# Patient Record
Sex: FEMALE | Race: WHITE | NOT HISPANIC OR LATINO | Employment: OTHER | URBAN - METROPOLITAN AREA
[De-identification: names, ages, dates, MRNs, and addresses within clinical notes are randomized per-mention and may not be internally consistent; named-entity substitution may affect disease eponyms.]

---

## 2017-04-27 ENCOUNTER — ALLSCRIPTS OFFICE VISIT (OUTPATIENT)
Dept: OTHER | Facility: OTHER | Age: 67
End: 2017-04-27

## 2017-04-27 DIAGNOSIS — Z12.31 ENCOUNTER FOR SCREENING MAMMOGRAM FOR MALIGNANT NEOPLASM OF BREAST: ICD-10-CM

## 2017-06-01 ENCOUNTER — HOSPITAL ENCOUNTER (OUTPATIENT)
Dept: RADIOLOGY | Age: 67
Discharge: HOME | End: 2017-06-01
Payer: COMMERCIAL

## 2017-06-01 DIAGNOSIS — Z12.31 ENCOUNTER FOR SCREENING MAMMOGRAM FOR MALIGNANT NEOPLASM OF BREAST: ICD-10-CM

## 2017-06-01 PROCEDURE — G0202 SCR MAMMO BI INCL CAD: HCPCS

## 2018-01-12 VITALS
BODY MASS INDEX: 29.45 KG/M2 | HEIGHT: 60 IN | WEIGHT: 150 LBS | SYSTOLIC BLOOD PRESSURE: 122 MMHG | DIASTOLIC BLOOD PRESSURE: 80 MMHG

## 2018-05-21 DIAGNOSIS — Z12.31 ENCOUNTER FOR SCREENING MAMMOGRAM FOR MALIGNANT NEOPLASM OF BREAST: Primary | ICD-10-CM

## 2018-06-05 ENCOUNTER — TRANSCRIBE ORDERS (OUTPATIENT)
Dept: RADIOLOGY | Facility: CLINIC | Age: 68
End: 2018-06-05

## 2018-06-06 ENCOUNTER — HOSPITAL ENCOUNTER (OUTPATIENT)
Dept: RADIOLOGY | Age: 68
Discharge: HOME | End: 2018-06-06
Payer: COMMERCIAL

## 2018-06-06 DIAGNOSIS — Z12.31 ENCOUNTER FOR SCREENING MAMMOGRAM FOR MALIGNANT NEOPLASM OF BREAST: ICD-10-CM

## 2018-06-06 PROCEDURE — 77067 SCR MAMMO BI INCL CAD: CPT

## 2019-04-23 ENCOUNTER — ANNUAL EXAM (OUTPATIENT)
Dept: OBGYN CLINIC | Facility: CLINIC | Age: 69
End: 2019-04-23
Payer: COMMERCIAL

## 2019-04-23 VITALS
SYSTOLIC BLOOD PRESSURE: 122 MMHG | WEIGHT: 153.2 LBS | HEIGHT: 60 IN | DIASTOLIC BLOOD PRESSURE: 80 MMHG | BODY MASS INDEX: 30.08 KG/M2

## 2019-04-23 DIAGNOSIS — Z01.419 ENCOUNTER FOR ANNUAL ROUTINE GYNECOLOGICAL EXAMINATION: Primary | ICD-10-CM

## 2019-04-23 DIAGNOSIS — Z12.39 BREAST CANCER SCREENING: ICD-10-CM

## 2019-04-23 DIAGNOSIS — M85.80 OSTEOPENIA, UNSPECIFIED LOCATION: ICD-10-CM

## 2019-04-23 PROCEDURE — G0101 CA SCREEN;PELVIC/BREAST EXAM: HCPCS | Performed by: OBSTETRICS & GYNECOLOGY

## 2019-04-23 RX ORDER — EZETIMIBE 10 MG/1
TABLET ORAL
COMMUNITY
Start: 2019-03-29

## 2019-04-23 RX ORDER — PANTOPRAZOLE SODIUM 40 MG/1
TABLET, DELAYED RELEASE ORAL
COMMUNITY

## 2019-04-23 RX ORDER — FENOFIBRATE 145 MG/1
145 TABLET, COATED ORAL
COMMUNITY
Start: 2018-05-30

## 2019-06-12 ENCOUNTER — TELEPHONE (OUTPATIENT)
Dept: OBGYN CLINIC | Facility: CLINIC | Age: 69
End: 2019-06-12

## 2019-06-12 ENCOUNTER — HOSPITAL ENCOUNTER (OUTPATIENT)
Dept: RADIOLOGY | Age: 69
Discharge: HOME | End: 2019-06-12
Payer: COMMERCIAL

## 2019-06-12 VITALS — BODY MASS INDEX: 29.45 KG/M2 | HEIGHT: 60 IN | WEIGHT: 150 LBS

## 2019-06-12 DIAGNOSIS — M85.80 OSTEOPENIA, UNSPECIFIED LOCATION: ICD-10-CM

## 2019-06-12 DIAGNOSIS — Z12.39 BREAST CANCER SCREENING: ICD-10-CM

## 2019-06-12 PROCEDURE — 77080 DXA BONE DENSITY AXIAL: CPT

## 2019-06-12 PROCEDURE — 77067 SCR MAMMO BI INCL CAD: CPT

## 2020-06-17 ENCOUNTER — HOSPITAL ENCOUNTER (OUTPATIENT)
Dept: RADIOLOGY | Age: 70
Discharge: HOME | End: 2020-06-17
Payer: COMMERCIAL

## 2020-06-17 VITALS — HEIGHT: 60 IN | WEIGHT: 150 LBS | BODY MASS INDEX: 29.45 KG/M2

## 2020-06-17 DIAGNOSIS — Z12.31 ENCOUNTER FOR SCREENING MAMMOGRAM FOR MALIGNANT NEOPLASM OF BREAST: ICD-10-CM

## 2020-06-17 PROCEDURE — 77063 BREAST TOMOSYNTHESIS BI: CPT

## 2020-06-17 PROCEDURE — 77067 SCR MAMMO BI INCL CAD: CPT

## 2021-03-30 DIAGNOSIS — Z23 ENCOUNTER FOR IMMUNIZATION: ICD-10-CM

## 2021-04-28 ENCOUNTER — ANNUAL EXAM (OUTPATIENT)
Dept: OBGYN CLINIC | Facility: CLINIC | Age: 71
End: 2021-04-28
Payer: COMMERCIAL

## 2021-04-28 VITALS
SYSTOLIC BLOOD PRESSURE: 112 MMHG | HEIGHT: 60 IN | DIASTOLIC BLOOD PRESSURE: 72 MMHG | BODY MASS INDEX: 30.04 KG/M2 | WEIGHT: 153 LBS

## 2021-04-28 DIAGNOSIS — Z01.419 ENCOUNTER FOR ANNUAL ROUTINE GYNECOLOGICAL EXAMINATION: Primary | ICD-10-CM

## 2021-04-28 DIAGNOSIS — Z13.820 ENCOUNTER FOR SCREENING FOR OSTEOPOROSIS: ICD-10-CM

## 2021-04-28 DIAGNOSIS — Z12.31 ENCOUNTER FOR SCREENING MAMMOGRAM FOR BREAST CANCER: ICD-10-CM

## 2021-04-28 PROCEDURE — G0101 CA SCREEN;PELVIC/BREAST EXAM: HCPCS | Performed by: OBSTETRICS & GYNECOLOGY

## 2021-04-28 RX ORDER — MULTIVIT,IRON,MINERALS/LUTEIN
TABLET ORAL
COMMUNITY

## 2021-04-28 NOTE — PROGRESS NOTES
Assessment/Plan:   Pap smear deferred due to low risk status  Encouraged self-breast examination as well as calcium supplementation  Continue annual mammogram   Reviewed colon cancer screening, due for colonoscopy this year  She will have follow-up DEXA scan follow-up osteopenia  She is reluctant to go on any medication, would like to avoid all bisphosphonate agents  She will continue to follow-up with her primary care physician as scheduled  Return to office in 2 years per Medicare recommendations/protocol  No problem-specific Assessment & Plan notes found for this encounter  Diagnoses and all orders for this visit:    Encounter for annual routine gynecological examination    Encounter for screening mammogram for breast cancer  -     Mammo screening bilateral w 3d & cad; Future    Encounter for screening for osteoporosis  -     DXA bone density spine hip and pelvis; Future    Other orders  -     Cholecalciferol (Vitamin D3) 125 MCG (5000 UT) TABS; Take 5,000 Units by mouth daily  -     Multiple Vitamins-Minerals (Centrum Silver Ultra Womens) TABS; Take by mouth          Subjective:      Patient ID: Constantin Benavides is a 69 y o  female  HPI       This is a pleasant 69-year-old female P1 ( x1) presents for her annual gyn exam   Patient went through menopause and  after JUAN BSO  She was on hormone replacement therapy for approximately 15 years  She denies any vaginal bleeding or spotting  There has been no changes in bowel or bladder function  She underwent colonoscopy   She does follow up with her family physician on a regular basis  She has been in a monogamous relationship with her  for over 50 years  Pap smears had been normal     She does follow up with Dermatology for diffuse rash consistent with granuloma angulari  she did receive her COVID vaccine      The following portions of the patient's history were reviewed and updated as appropriate: allergies, current medications, past family history, past medical history, past social history, past surgical history and problem list     Review of Systems   Constitutional: Negative for fatigue, fever and unexpected weight change  Respiratory: Negative for cough, chest tightness, shortness of breath and wheezing  Cardiovascular: Negative  Negative for chest pain and palpitations  Gastrointestinal: Negative  Negative for abdominal distention, abdominal pain, blood in stool, constipation, diarrhea, nausea and vomiting  Genitourinary: Negative  Negative for difficulty urinating, dyspareunia, dysuria, flank pain, frequency, genital sores, hematuria, pelvic pain, urgency, vaginal bleeding, vaginal discharge and vaginal pain  Skin: Negative for rash  Objective:      /72   Ht 4' 11 5" (1 511 m)   Wt 69 4 kg (153 lb)   BMI 30 39 kg/m²          Physical Exam  Constitutional:       Appearance: Normal appearance  She is well-developed  Cardiovascular:      Rate and Rhythm: Normal rate and regular rhythm  Pulmonary:      Effort: Pulmonary effort is normal       Breath sounds: Normal breath sounds  Chest:      Breasts:         Right: No inverted nipple, mass, nipple discharge, skin change or tenderness  Left: No inverted nipple, mass, nipple discharge, skin change or tenderness  Abdominal:      General: Bowel sounds are normal  There is no distension  Palpations: Abdomen is soft  Tenderness: There is no abdominal tenderness  There is no guarding or rebound  Genitourinary:     Labia:         Right: No rash, tenderness or lesion  Left: No rash, tenderness or lesion  Vagina: Normal  No signs of injury  No vaginal discharge, tenderness or lesions  Uterus: Absent  Adnexa:         Right: No mass, tenderness or fullness  Left: No mass, tenderness or fullness  Comments: External genitalia is within normal limits    The vagina is evident of estrogen deficiency  Cervix is surgically absent  The cuff is well-supported  Rectovaginal exam is confirmatory  Skin:     General: Skin is warm and dry  Neurological:      Mental Status: She is alert and oriented to person, place, and time

## 2021-06-21 ENCOUNTER — HOSPITAL ENCOUNTER (OUTPATIENT)
Dept: RADIOLOGY | Age: 71
Discharge: HOME | End: 2021-06-21
Payer: COMMERCIAL

## 2021-06-21 VITALS — BODY MASS INDEX: 30.04 KG/M2 | HEIGHT: 60 IN | WEIGHT: 153 LBS

## 2021-06-21 DIAGNOSIS — Z12.31 ENCOUNTER FOR SCREENING MAMMOGRAM FOR BREAST CANCER: ICD-10-CM

## 2021-06-21 PROCEDURE — 77063 BREAST TOMOSYNTHESIS BI: CPT

## 2021-06-21 PROCEDURE — 77067 SCR MAMMO BI INCL CAD: CPT

## 2021-06-28 ENCOUNTER — HOSPITAL ENCOUNTER (OUTPATIENT)
Dept: RADIOLOGY | Age: 71
Discharge: HOME | End: 2021-06-28
Payer: COMMERCIAL

## 2021-06-28 DIAGNOSIS — Z13.820 ENCOUNTER FOR SCREENING FOR OSTEOPOROSIS: ICD-10-CM

## 2021-06-28 PROCEDURE — 77080 DXA BONE DENSITY AXIAL: CPT

## 2021-06-29 ENCOUNTER — TELEPHONE (OUTPATIENT)
Dept: OBGYN CLINIC | Facility: CLINIC | Age: 71
End: 2021-06-29

## 2021-06-29 NOTE — TELEPHONE ENCOUNTER
----- Message from Christiano Post DO sent at 6/29/2021 12:52 PM EDT -----   Inform patient DEXA scan reveals osteopenia, significant improvement in prior DEXA in 2019  Recommend calcium and vitamin-D supplement with repeat DEXA scan in 2 years

## 2022-06-22 ENCOUNTER — HOSPITAL ENCOUNTER (OUTPATIENT)
Dept: RADIOLOGY | Age: 72
Discharge: HOME | End: 2022-06-22
Payer: COMMERCIAL

## 2022-06-22 VITALS — HEIGHT: 59 IN | BODY MASS INDEX: 30.24 KG/M2 | WEIGHT: 150 LBS

## 2022-06-22 DIAGNOSIS — Z12.31 VISIT FOR SCREENING MAMMOGRAM: ICD-10-CM

## 2022-06-22 PROCEDURE — 77063 BREAST TOMOSYNTHESIS BI: CPT

## 2022-06-22 PROCEDURE — 77067 SCR MAMMO BI INCL CAD: CPT

## 2023-04-03 ENCOUNTER — ANNUAL EXAM (OUTPATIENT)
Dept: OBGYN CLINIC | Facility: CLINIC | Age: 73
End: 2023-04-03

## 2023-04-03 VITALS
HEIGHT: 59 IN | SYSTOLIC BLOOD PRESSURE: 124 MMHG | DIASTOLIC BLOOD PRESSURE: 80 MMHG | BODY MASS INDEX: 28.02 KG/M2 | WEIGHT: 139 LBS

## 2023-04-03 DIAGNOSIS — Z12.39 ENCOUNTER FOR SCREENING FOR MALIGNANT NEOPLASM OF BREAST, UNSPECIFIED SCREENING MODALITY: ICD-10-CM

## 2023-04-03 DIAGNOSIS — M85.88 OSTEOPENIA OF LUMBAR SPINE: ICD-10-CM

## 2023-04-03 DIAGNOSIS — Z01.419 ENCOUNTER FOR ANNUAL ROUTINE GYNECOLOGICAL EXAMINATION: Primary | ICD-10-CM

## 2023-04-03 NOTE — PROGRESS NOTES
Assessment/Plan:  Pap smear deferred due to low risk status  Encourage self breast examination as well as calcium supplementation  Continue annual mammogram   Reviewed colon cancer screening, up-to-date with colonoscopy  Recommend DEXA scan follow-up osteopenia 2023  She will continue to follow-up with primary care as scheduled  Return to office in 2 years per Medicare recommendations/protocol  No problem-specific Assessment & Plan notes found for this encounter  Diagnoses and all orders for this visit:    Encounter for annual routine gynecological examination    Encounter for screening for malignant neoplasm of breast, unspecified screening modality    Osteopenia of lumbar spine  -     DXA bone density spine hip and pelvis; Future          Subjective:      Patient ID: Jeyson Abebe is a 67 y o  female  HPI     This is a pleasant 70-year-old female P1 ( x1, age 54) presents for GYN exam   Patient went through menopause in  after JUAN/BSO  She was on hormone replacement therapy for approximately 15 years  She denies any vaginal bleeding or spotting  No changes in bowel or bladder function  She has been  for 50 years  They have not been sexually active in several years   underwent prostate surgery approximately 5 years ago  Her Pap smears have been normal   She does follow-up with her family physician on a regular basis  She is scheduled for colonoscopy in the near future  TALOGAN    The following portions of the patient's history were reviewed and updated as appropriate: allergies, current medications, past family history, past medical history, past social history, past surgical history and problem list     Review of Systems   Constitutional: Negative for fatigue, fever and unexpected weight change  Respiratory: Negative for cough, chest tightness, shortness of breath and wheezing  Cardiovascular: Negative  Negative for chest pain and palpitations  "  Gastrointestinal: Negative  Negative for abdominal distention, abdominal pain, blood in stool, constipation, diarrhea, nausea and vomiting  Genitourinary: Negative  Negative for difficulty urinating, dyspareunia, dysuria, flank pain, frequency, genital sores, hematuria, pelvic pain, urgency, vaginal bleeding, vaginal discharge and vaginal pain  Skin: Negative for rash  Objective:      /80   Ht 4' 11\" (1 499 m)   Wt 63 kg (139 lb)   BMI 28 07 kg/m²          Physical Exam  Constitutional:       Appearance: Normal appearance  She is well-developed  Cardiovascular:      Rate and Rhythm: Normal rate and regular rhythm  Pulmonary:      Effort: Pulmonary effort is normal       Breath sounds: Normal breath sounds  Chest:   Breasts:     Right: No inverted nipple, mass, nipple discharge, skin change or tenderness  Left: No inverted nipple, mass, nipple discharge, skin change or tenderness  Abdominal:      General: Bowel sounds are normal  There is no distension  Palpations: Abdomen is soft  Tenderness: There is no abdominal tenderness  There is no guarding or rebound  Genitourinary:     Labia:         Right: No rash, tenderness or lesion  Left: No rash, tenderness or lesion  Vagina: Normal  No signs of injury  No vaginal discharge or tenderness  Uterus: Absent  Adnexa:         Right: No mass, tenderness or fullness  Left: No mass, tenderness or fullness  Neurological:      Mental Status: She is alert and oriented to person, place, and time  Psychiatric:         Behavior: Behavior normal        External genitalia is within normal limits  The vagina is evident of slight estrogen deficiency  The cervix is surgically absent  The cuff is well supported  Rectovaginal exam is confirmatory    "

## 2023-06-28 ENCOUNTER — HOSPITAL ENCOUNTER (OUTPATIENT)
Dept: RADIOLOGY | Age: 73
Discharge: HOME | End: 2023-06-28
Payer: COMMERCIAL

## 2023-06-28 VITALS — BODY MASS INDEX: 27.01 KG/M2 | HEIGHT: 59 IN | WEIGHT: 134 LBS

## 2023-06-28 DIAGNOSIS — Z12.31 ENCOUNTER FOR SCREENING MAMMOGRAM FOR MALIGNANT NEOPLASM OF BREAST: ICD-10-CM

## 2023-06-28 PROCEDURE — 77063 BREAST TOMOSYNTHESIS BI: CPT

## 2023-06-28 PROCEDURE — 77067 SCR MAMMO BI INCL CAD: CPT

## 2023-08-29 ENCOUNTER — HOSPITAL ENCOUNTER (OUTPATIENT)
Dept: RADIOLOGY | Age: 73
Discharge: HOME | End: 2023-08-29
Payer: COMMERCIAL

## 2023-08-29 DIAGNOSIS — M85.88 OSTEOPENIA OF LUMBAR SPINE: ICD-10-CM

## 2023-08-29 PROCEDURE — 77080 DXA BONE DENSITY AXIAL: CPT

## 2024-07-03 ENCOUNTER — HOSPITAL ENCOUNTER (OUTPATIENT)
Dept: RADIOLOGY | Age: 74
Discharge: HOME | End: 2024-07-03
Payer: COMMERCIAL

## 2024-07-03 DIAGNOSIS — Z12.31 VISIT FOR SCREENING MAMMOGRAM: ICD-10-CM

## 2024-07-03 PROCEDURE — 77067 SCR MAMMO BI INCL CAD: CPT

## 2024-07-03 PROCEDURE — 77063 BREAST TOMOSYNTHESIS BI: CPT

## 2024-10-19 ENCOUNTER — HOSPITAL ENCOUNTER (INPATIENT)
Facility: HOSPITAL | Age: 74
LOS: 2 days | Discharge: HOME | DRG: 418 | End: 2024-10-21
Attending: EMERGENCY MEDICINE | Admitting: SURGERY
Payer: COMMERCIAL

## 2024-10-19 ENCOUNTER — APPOINTMENT (EMERGENCY)
Dept: CT IMAGING | Facility: HOSPITAL | Age: 74
DRG: 418 | End: 2024-10-19
Payer: COMMERCIAL

## 2024-10-19 DIAGNOSIS — K81.0 CHOLECYSTITIS, ACUTE: Primary | ICD-10-CM

## 2024-10-19 DIAGNOSIS — R10.9 ABDOMINAL PAIN: ICD-10-CM

## 2024-10-19 PROBLEM — K81.9 CHOLECYSTITIS: Status: ACTIVE | Noted: 2024-10-19

## 2024-10-19 LAB
ALBUMIN SERPL BCG-MCNC: 4.2 G/DL (ref 3.5–5)
ALP SERPL-CCNC: 48 U/L (ref 34–104)
ALT SERPL W P-5'-P-CCNC: 14 U/L (ref 7–52)
ANION GAP SERPL CALCULATED.3IONS-SCNC: 8 MMOL/L (ref 4–13)
AST SERPL W P-5'-P-CCNC: 12 U/L (ref 13–39)
BASOPHILS # BLD AUTO: 0.03 THOUSANDS/ΜL (ref 0–0.1)
BASOPHILS NFR BLD AUTO: 0 % (ref 0–1)
BILIRUB SERPL-MCNC: 0.84 MG/DL (ref 0.2–1)
BUN SERPL-MCNC: 12 MG/DL (ref 5–25)
CALCIUM SERPL-MCNC: 9.5 MG/DL (ref 8.4–10.2)
CHLORIDE SERPL-SCNC: 104 MMOL/L (ref 96–108)
CO2 SERPL-SCNC: 23 MMOL/L (ref 21–32)
CREAT SERPL-MCNC: 0.57 MG/DL (ref 0.6–1.3)
EOSINOPHIL # BLD AUTO: 0.04 THOUSAND/ΜL (ref 0–0.61)
EOSINOPHIL NFR BLD AUTO: 0 % (ref 0–6)
ERYTHROCYTE [DISTWIDTH] IN BLOOD BY AUTOMATED COUNT: 12.5 % (ref 11.6–15.1)
GFR SERPL CREATININE-BSD FRML MDRD: 90 ML/MIN/1.73SQ M
GLUCOSE SERPL-MCNC: 107 MG/DL (ref 65–140)
HCT VFR BLD AUTO: 38.9 % (ref 34.8–46.1)
HGB BLD-MCNC: 13 G/DL (ref 11.5–15.4)
IMM GRANULOCYTES # BLD AUTO: 0.02 THOUSAND/UL (ref 0–0.2)
IMM GRANULOCYTES NFR BLD AUTO: 0 % (ref 0–2)
LIPASE SERPL-CCNC: 6 U/L (ref 11–82)
LYMPHOCYTES # BLD AUTO: 1.42 THOUSANDS/ΜL (ref 0.6–4.47)
LYMPHOCYTES NFR BLD AUTO: 13 % (ref 14–44)
MCH RBC QN AUTO: 29 PG (ref 26.8–34.3)
MCHC RBC AUTO-ENTMCNC: 33.4 G/DL (ref 31.4–37.4)
MCV RBC AUTO: 87 FL (ref 82–98)
MONOCYTES # BLD AUTO: 1.02 THOUSAND/ΜL (ref 0.17–1.22)
MONOCYTES NFR BLD AUTO: 10 % (ref 4–12)
NEUTROPHILS # BLD AUTO: 8.11 THOUSANDS/ΜL (ref 1.85–7.62)
NEUTS SEG NFR BLD AUTO: 77 % (ref 43–75)
NRBC BLD AUTO-RTO: 0 /100 WBCS
PLATELET # BLD AUTO: 248 THOUSANDS/UL (ref 149–390)
PMV BLD AUTO: 8.9 FL (ref 8.9–12.7)
POTASSIUM SERPL-SCNC: 3.7 MMOL/L (ref 3.5–5.3)
PROT SERPL-MCNC: 7.2 G/DL (ref 6.4–8.4)
RBC # BLD AUTO: 4.49 MILLION/UL (ref 3.81–5.12)
SODIUM SERPL-SCNC: 135 MMOL/L (ref 135–147)
WBC # BLD AUTO: 10.64 THOUSAND/UL (ref 4.31–10.16)

## 2024-10-19 PROCEDURE — 80053 COMPREHEN METABOLIC PANEL: CPT

## 2024-10-19 PROCEDURE — 99285 EMERGENCY DEPT VISIT HI MDM: CPT

## 2024-10-19 PROCEDURE — 83690 ASSAY OF LIPASE: CPT

## 2024-10-19 PROCEDURE — 96375 TX/PRO/DX INJ NEW DRUG ADDON: CPT

## 2024-10-19 PROCEDURE — 74177 CT ABD & PELVIS W/CONTRAST: CPT

## 2024-10-19 PROCEDURE — 96365 THER/PROPH/DIAG IV INF INIT: CPT

## 2024-10-19 PROCEDURE — 36415 COLL VENOUS BLD VENIPUNCTURE: CPT

## 2024-10-19 PROCEDURE — 85025 COMPLETE CBC W/AUTO DIFF WBC: CPT

## 2024-10-19 PROCEDURE — 96361 HYDRATE IV INFUSION ADD-ON: CPT

## 2024-10-19 PROCEDURE — 99284 EMERGENCY DEPT VISIT MOD MDM: CPT

## 2024-10-19 PROCEDURE — 99223 1ST HOSP IP/OBS HIGH 75: CPT | Performed by: SURGERY

## 2024-10-19 RX ORDER — ENOXAPARIN SODIUM 100 MG/ML
40 INJECTION SUBCUTANEOUS DAILY
Status: DISCONTINUED | OUTPATIENT
Start: 2024-10-20 | End: 2024-10-21 | Stop reason: HOSPADM

## 2024-10-19 RX ORDER — ACETAMINOPHEN 325 MG/1
650 TABLET ORAL EVERY 6 HOURS PRN
Status: DISCONTINUED | OUTPATIENT
Start: 2024-10-19 | End: 2024-10-21 | Stop reason: HOSPADM

## 2024-10-19 RX ORDER — OXYCODONE HYDROCHLORIDE 5 MG/1
5 TABLET ORAL EVERY 4 HOURS PRN
Status: DISCONTINUED | OUTPATIENT
Start: 2024-10-19 | End: 2024-10-21 | Stop reason: HOSPADM

## 2024-10-19 RX ORDER — FAMOTIDINE 10 MG/ML
20 INJECTION, SOLUTION INTRAVENOUS ONCE
Status: COMPLETED | OUTPATIENT
Start: 2024-10-19 | End: 2024-10-19

## 2024-10-19 RX ORDER — ONDANSETRON 2 MG/ML
4 INJECTION INTRAMUSCULAR; INTRAVENOUS EVERY 6 HOURS PRN
Status: DISCONTINUED | OUTPATIENT
Start: 2024-10-19 | End: 2024-10-21 | Stop reason: HOSPADM

## 2024-10-19 RX ORDER — METRONIDAZOLE 500 MG/100ML
500 INJECTION, SOLUTION INTRAVENOUS EVERY 8 HOURS
Status: DISCONTINUED | OUTPATIENT
Start: 2024-10-20 | End: 2024-10-20

## 2024-10-19 RX ORDER — HYDROMORPHONE HCL/PF 1 MG/ML
0.5 SYRINGE (ML) INJECTION ONCE
Status: COMPLETED | OUTPATIENT
Start: 2024-10-19 | End: 2024-10-19

## 2024-10-19 RX ORDER — SODIUM CHLORIDE, SODIUM GLUCONATE, SODIUM ACETATE, POTASSIUM CHLORIDE, MAGNESIUM CHLORIDE, SODIUM PHOSPHATE, DIBASIC, AND POTASSIUM PHOSPHATE .53; .5; .37; .037; .03; .012; .00082 G/100ML; G/100ML; G/100ML; G/100ML; G/100ML; G/100ML; G/100ML
100 INJECTION, SOLUTION INTRAVENOUS CONTINUOUS
Status: DISCONTINUED | OUTPATIENT
Start: 2024-10-19 | End: 2024-10-20

## 2024-10-19 RX ORDER — KETOROLAC TROMETHAMINE 30 MG/ML
15 INJECTION, SOLUTION INTRAMUSCULAR; INTRAVENOUS ONCE
Status: COMPLETED | OUTPATIENT
Start: 2024-10-19 | End: 2024-10-19

## 2024-10-19 RX ORDER — PANTOPRAZOLE SODIUM 40 MG/1
40 TABLET, DELAYED RELEASE ORAL
Status: DISCONTINUED | OUTPATIENT
Start: 2024-10-20 | End: 2024-10-21 | Stop reason: HOSPADM

## 2024-10-19 RX ORDER — HYDROMORPHONE HCL IN WATER/PF 6 MG/30 ML
0.2 PATIENT CONTROLLED ANALGESIA SYRINGE INTRAVENOUS
Status: DISCONTINUED | OUTPATIENT
Start: 2024-10-19 | End: 2024-10-21 | Stop reason: HOSPADM

## 2024-10-19 RX ADMIN — FAMOTIDINE 20 MG: 10 INJECTION INTRAVENOUS at 19:53

## 2024-10-19 RX ADMIN — SODIUM CHLORIDE 1000 ML: 0.9 INJECTION, SOLUTION INTRAVENOUS at 19:53

## 2024-10-19 RX ADMIN — KETOROLAC TROMETHAMINE 15 MG: 30 INJECTION, SOLUTION INTRAMUSCULAR; INTRAVENOUS at 19:53

## 2024-10-19 RX ADMIN — HYDROMORPHONE HYDROCHLORIDE 0.5 MG: 1 INJECTION, SOLUTION INTRAMUSCULAR; INTRAVENOUS; SUBCUTANEOUS at 22:37

## 2024-10-19 RX ADMIN — CEFTRIAXONE 1000 MG: 1 INJECTION, POWDER, FOR SOLUTION INTRAMUSCULAR; INTRAVENOUS at 22:29

## 2024-10-19 RX ADMIN — IOHEXOL 85 ML: 350 INJECTION, SOLUTION INTRAVENOUS at 20:18

## 2024-10-19 NOTE — ED PROVIDER NOTES
Time reflects when diagnosis was documented in both MDM as applicable and the Disposition within this note       Time User Action Codes Description Comment    10/19/2024 10:32 PM Devante Sherwood [K81.0] Cholecystitis, acute     10/19/2024 11:41 PM Devante Sherwood [R10.9] Abdominal pain           ED Disposition       ED Disposition   Admit    Condition   Stable    Date/Time   Sat Oct 19, 2024 11:41 PM    Comment   Case was discussed with general surgery and the patient's admission status was agreed to be Admission Status: inpatient status to the service of Dr. Garcia.               Assessment & Plan       Medical Decision Making  74-year-old female presents to ED for evaluation of abdominal pain as seen in HPI.  On physical examination patient is slightly tachycardic in 109 bpm.  Afebrile.  Slightly hypertensive at 188/90.  Nonhypoxic.  Alert and responding to questions appropriately.  No murmur.  Normal breath sounds.  Tender to palpation of right upper quadrant, epigastric region.  Nontoxic-appearing.  Obtaining workup consisting of CBC, CMP, lipase, CT abdomen pelvis with IV contrast.  Pain control with Toradol, Pepcid. IV fluids.  Differential diagnosis includes but not limited to cholecystitis, cholangitis, cholelithiasis, acid reflux, viral GI infection, bowel obstruction, pancreatitis, diverticulitis     CBC with minimal leukocytosis of 10.64.  CMP without elevated liver enzymes.  Lipase WNL.  CT abdomen pelvis with IV contrast returned demonstrating acute calculus cholecystitis.  Given this result, providing patient with ceftriaxone and consulting general surgery for further evaluation.     General surgery evaluated patient.  Patient to be admitted to general surgery service.     Amount and/or Complexity of Data Reviewed  Labs: ordered.  Radiology: ordered.    Risk  Prescription drug management.  Decision regarding hospitalization.             Medications   pantoprazole (PROTONIX) EC tablet 40 mg (has  no administration in time range)   multi-electrolyte (PLASMALYTE-A/ISOLYTE-S PH 7.4) IV solution (100 mL/hr Intravenous New Bag 10/20/24 0046)   ondansetron (ZOFRAN) injection 4 mg (has no administration in time range)   enoxaparin (LOVENOX) subcutaneous injection 40 mg (has no administration in time range)   ceftriaxone (ROCEPHIN) 1 g/50 mL in dextrose IVPB (has no administration in time range)   metroNIDAZOLE (FLAGYL) IVPB (premix) 500 mg 100 mL (0 mg Intravenous Stopped 10/20/24 0035)   acetaminophen (TYLENOL) tablet 650 mg (has no administration in time range)   HYDROmorphone HCl (DILAUDID) injection 0.2 mg (has no administration in time range)   oxyCODONE (ROXICODONE) IR tablet 5 mg (has no administration in time range)   oxyCODONE (ROXICODONE) split tablet 2.5 mg (has no administration in time range)   sodium chloride 0.9 % bolus 1,000 mL (0 mL Intravenous Stopped 10/19/24 2053)   ketorolac (TORADOL) injection 15 mg (15 mg Intravenous Given 10/19/24 1953)   Famotidine (PF) (PEPCID) injection 20 mg (20 mg Intravenous Given 10/19/24 1953)   iohexol (OMNIPAQUE) 350 MG/ML injection (MULTI-DOSE) 85 mL (85 mL Intravenous Given 10/19/24 2018)   ceftriaxone (ROCEPHIN) 1 g/50 mL in dextrose IVPB (0 mg Intravenous Stopped 10/19/24 2259)   HYDROmorphone (DILAUDID) injection 0.5 mg (0.5 mg Intravenous Given 10/19/24 2237)       ED Risk Strat Scores                           SBIRT 22yo+      Flowsheet Row Most Recent Value   Initial Alcohol Screen: US AUDIT-C     1. How often do you have a drink containing alcohol? 0 Filed at: 10/19/2024 1944   2. How many drinks containing alcohol do you have on a typical day you are drinking?  0 Filed at: 10/19/2024 1944   3a. Male UNDER 65: How often do you have five or more drinks on one occasion? 0 Filed at: 10/19/2024 1944   3b. FEMALE Any Age, or MALE 65+: How often do you have 4 or more drinks on one occassion? 0 Filed at: 10/19/2024 1944   Audit-C Score 0 Filed at: 10/19/2024 1944    MITZI: How many times in the past year have you...    Used an illegal drug or used a prescription medication for non-medical reasons? Never Filed at: 10/19/2024 1944                            History of Present Illness       Chief Complaint   Patient presents with    Abdominal Pain     Pt reports abdominal pain and decreased appetite for the past two days. Denies n/v/d. Pt states she feels very full and is interrupting her sleep.        Past Medical History:   Diagnosis Date    Hypercholesterolemia       Past Surgical History:   Procedure Laterality Date    APPENDECTOMY  1988    BREAST BIOPSY Right 11/17/2006    u/s core bx    BREAST EXCISIONAL BIOPSY Left 03/27/1998    DILATION AND CURETTAGE OF UTERUS      TOTAL ABDOMINAL HYSTERECTOMY Bilateral 1988    age 38    TOTAL ABDOMINAL HYSTERECTOMY W/ BILATERAL SALPINGOOPHORECTOMY Bilateral 1988    age 38    TRANSOBTURATOR SLING  03/2008    Urogynecology      Family History   Problem Relation Age of Onset    Stomach cancer Father 77    No Known Problems Maternal Grandmother     No Known Problems Mother     No Known Problems Sister     No Known Problems Daughter     No Known Problems Maternal Grandfather     Breast cancer Paternal Grandmother 60    No Known Problems Paternal Grandfather     No Known Problems Sister     No Known Problems Maternal Aunt     No Known Problems Maternal Aunt     No Known Problems Maternal Aunt     No Known Problems Paternal Aunt       Social History     Tobacco Use    Smoking status: Never    Smokeless tobacco: Never   Vaping Use    Vaping status: Never Used   Substance Use Topics    Alcohol use: Never    Drug use: Never      E-Cigarette/Vaping    E-Cigarette Use Never User       E-Cigarette/Vaping Substances    Nicotine No     THC No     CBD No     Flavoring No     Other No       I have reviewed and agree with the history as documented.     75 yo female with history of GERD, hypercholesterolemia presents to ED for evaluation of abdominal pain.   Patient states that the past 2 days she has been experiencing epigastric and right upper quadrant abdominal pain, abdominal distention, decreased appetite, abdominal fullness.  The pain has been consistent.  The pain is very abnormal for patient's.  Denies history of similar symptoms.  Denies nausea, vomiting.  States that when she eats she begins experiencing abdominal discomfort.  Denies diarrhea, bloody stool, fever, shortness of breath, chest pain, seizure, syncope.  Last night did experience an episode of chills which required her to put on extra blanket.  Has not taken any medication for the symptoms.  Has history of appendectomy, total hysterectomy.  This procedure occurred many years ago.  Denies history of bowel obstruction.          Review of Systems   Constitutional:  Positive for chills. Negative for diaphoresis and fever.   Respiratory:  Negative for cough, shortness of breath, wheezing and stridor.    Cardiovascular:  Negative for chest pain and palpitations.   Gastrointestinal:  Positive for abdominal distention and abdominal pain. Negative for anal bleeding, blood in stool, constipation, diarrhea and vomiting.   All other systems reviewed and are negative.          Objective       ED Triage Vitals [10/19/24 1904]   Temperature Pulse Blood Pressure Respirations SpO2 Patient Position - Orthostatic VS   98.8 °F (37.1 °C) (!) 109 (!) 188/90 18 100 % Sitting      Temp Source Heart Rate Source BP Location FiO2 (%) Pain Score    Oral Monitor Right arm -- 6      Vitals      Date and Time Temp Pulse SpO2 Resp BP Pain Score FACES Pain Rating User   10/20/24 0156 -- -- -- -- -- No Pain -- [facility]   10/20/24 0152 -- -- -- -- -- No Pain -- [facility]   10/20/24 0100 -- 80 95 % -- 119/66 -- --    10/20/24 0051 -- 83 96 % 18 119/66 -- --    10/20/24 0002 -- -- -- -- -- 2 --    10/19/24 2237 -- -- -- -- -- 4 --    10/19/24 2219 -- 94 95 % 18 145/69 -- --    10/19/24 2043 -- 87 99 % 18 157/74 -- --    10/19/24 2042  -- -- -- -- -- 2 --    10/19/24 1933 -- -- -- -- -- 7 --    10/19/24 1904 98.8 °F (37.1 °C) 109 100 % 18 188/90 6 -- JY            Physical Exam  Vitals and nursing note reviewed.   Constitutional:       General: She is not in acute distress.     Appearance: She is well-developed. She is ill-appearing. She is not toxic-appearing or diaphoretic.   HENT:      Head: Normocephalic and atraumatic.   Eyes:      Conjunctiva/sclera: Conjunctivae normal.   Cardiovascular:      Rate and Rhythm: Regular rhythm. Tachycardia present.      Heart sounds: Normal heart sounds. No murmur heard.     No friction rub. No gallop.   Pulmonary:      Effort: Pulmonary effort is normal. No respiratory distress.      Breath sounds: Normal breath sounds. No wheezing, rhonchi or rales.   Abdominal:      Palpations: Abdomen is soft. There is no shifting dullness, fluid wave, mass or pulsatile mass.      Tenderness: There is abdominal tenderness in the right upper quadrant and epigastric area. There is no guarding or rebound. Positive signs include Valerio's sign. Negative signs include Rovsing's sign and McBurney's sign.   Musculoskeletal:         General: No swelling.      Cervical back: Neck supple.   Skin:     General: Skin is warm and dry.      Capillary Refill: Capillary refill takes less than 2 seconds.   Neurological:      Mental Status: She is alert.   Psychiatric:         Mood and Affect: Mood normal.         Results Reviewed       Procedure Component Value Units Date/Time    Platelet count [[id number]]     Lab Status: No result Specimen: Blood     Comprehensive metabolic panel [[id number]]  (Abnormal) Collected: 10/19/24 1931    Lab Status: Final result Specimen: Blood from Arm, Right Updated: 10/19/24 2012     Sodium 135 mmol/L      Potassium 3.7 mmol/L      Chloride 104 mmol/L      CO2 23 mmol/L      ANION GAP 8 mmol/L      BUN 12 mg/dL      Creatinine 0.57 mg/dL      Glucose 107 mg/dL      Calcium 9.5 mg/dL      AST 12 U/L      ALT  14 U/L      Alkaline Phosphatase 48 U/L      Total Protein 7.2 g/dL      Albumin 4.2 g/dL      Total Bilirubin 0.84 mg/dL      eGFR 90 ml/min/1.73sq m     Narrative:      [employer] guidelines for Chronic Kidney Disease (CKD):     Stage 1 with normal or high GFR (GFR > 90 mL/min/1.73 square meters)    Stage 2 Mild CKD (GFR = 60-89 mL/min/1.73 square meters)    Stage 3A Moderate CKD (GFR = 45-59 mL/min/1.73 square meters)    Stage 3B Moderate CKD (GFR = 30-44 mL/min/1.73 square meters)    Stage 4 Severe CKD (GFR = 15-29 mL/min/1.73 square meters)    Stage 5 End Stage CKD (GFR <15 mL/min/1.73 square meters)  Note: GFR calculation is accurate only with a steady state creatinine    Lipase [[id number]]  (Abnormal) Collected: 10/19/24 1931    Lab Status: Final result Specimen: Blood from Arm, Right Updated: 10/19/24 2012     Lipase 6 u/L     CBC and differential [[id number]]  (Abnormal) Collected: 10/19/24 1931    Lab Status: Final result Specimen: Blood from Arm, Right Updated: 10/19/24 1943     WBC 10.64 Thousand/uL      RBC 4.49 Million/uL      Hemoglobin 13.0 g/dL      Hematocrit 38.9 %      MCV 87 fL      MCH 29.0 pg      MCHC 33.4 g/dL      RDW 12.5 %      MPV 8.9 fL      Platelets 248 Thousands/uL      nRBC 0 /100 WBCs      Segmented % 77 %      Immature Grans % 0 %      Lymphocytes % 13 %      Monocytes % 10 %      Eosinophils Relative 0 %      Basophils Relative 0 %      Absolute Neutrophils 8.11 Thousands/µL      Absolute Immature Grans 0.02 Thousand/uL      Absolute Lymphocytes 1.42 Thousands/µL      Absolute Monocytes 1.02 Thousand/µL      Eosinophils Absolute 0.04 Thousand/µL      Basophils Absolute 0.03 Thousands/µL             CT abdomen pelvis with contrast   Final Interpretation by Yon Ramesh MD (10/19 2217)      1.  Acute calculus cholecystitis.      2.  Mild hepatic steatosis.      The study was marked in EPIC for immediate notification.         Workstation performed:  [id number]             Procedures    ED Medication and Procedure Management   Prior to Admission Medications   Prescriptions Last Dose Informant Patient Reported? Taking?   Calcium Citrate-Vitamin D 250-200 MG-UNIT TABS   Yes No   Sig: Take 1 tablet by mouth   Cholecalciferol (Vitamin D3) 125 MCG (5000 UT) TABS   Yes No   Sig: Take 5,000 Units by mouth daily   LUTEIN PO   Yes No   Sig: Take 1 tablet by mouth   Multiple Vitamins-Minerals (Centrum Silver Ultra Womens) TABS   Yes No   Sig: Take by mouth   ezetimibe (ZETIA) 10 mg tablet   Yes No   fenofibrate (TRICOR) 145 mg tablet   Yes No   Sig: Take 145 mg by mouth   pantoprazole (PROTONIX) 40 mg tablet   Yes No   Sig: Take by mouth      Facility-Administered Medications: None     Current Discharge Medication List        CONTINUE these medications which have NOT CHANGED    Details   Calcium Citrate-Vitamin D 250-200 MG-UNIT TABS Take 1 tablet by mouth      Cholecalciferol (Vitamin D3) 125 MCG (5000 UT) TABS Take 5,000 Units by mouth daily      ezetimibe (ZETIA) 10 mg tablet       fenofibrate (TRICOR) 145 mg tablet Take 145 mg by mouth      LUTEIN PO Take 1 tablet by mouth      Multiple Vitamins-Minerals (Centrum Silver Ultra Womens) TABS Take by mouth      pantoprazole (PROTONIX) 40 mg tablet Take by mouth           No discharge procedures on file.  ED SEPSIS DOCUMENTATION   Time reflects when diagnosis was documented in both MDM as applicable and the Disposition within this note       Time User Action Codes Description Comment    10/19/2024 10:32 PM Devante Sherwood [K81.0] Cholecystitis, acute     10/19/2024 11:41 PM Devante Sherwood [R10.9] Abdominal pain                  Devante Sherwood PA-C  10/20/24 0346

## 2024-10-20 ENCOUNTER — ANESTHESIA (INPATIENT)
Dept: PERIOP | Facility: HOSPITAL | Age: 74
DRG: 418 | End: 2024-10-20
Payer: COMMERCIAL

## 2024-10-20 ENCOUNTER — ANESTHESIA EVENT (INPATIENT)
Dept: PERIOP | Facility: HOSPITAL | Age: 74
DRG: 418 | End: 2024-10-20
Payer: COMMERCIAL

## 2024-10-20 LAB
ALBUMIN SERPL BCG-MCNC: 4.1 G/DL (ref 3.5–5)
ALP SERPL-CCNC: 47 U/L (ref 34–104)
ALT SERPL W P-5'-P-CCNC: 15 U/L (ref 7–52)
ANION GAP SERPL CALCULATED.3IONS-SCNC: 7 MMOL/L (ref 4–13)
AST SERPL W P-5'-P-CCNC: 12 U/L (ref 13–39)
BILIRUB SERPL-MCNC: 0.71 MG/DL (ref 0.2–1)
BUN SERPL-MCNC: 12 MG/DL (ref 5–25)
CALCIUM SERPL-MCNC: 9.3 MG/DL (ref 8.4–10.2)
CHLORIDE SERPL-SCNC: 105 MMOL/L (ref 96–108)
CO2 SERPL-SCNC: 26 MMOL/L (ref 21–32)
CREAT SERPL-MCNC: 0.61 MG/DL (ref 0.6–1.3)
ERYTHROCYTE [DISTWIDTH] IN BLOOD BY AUTOMATED COUNT: 12.6 % (ref 11.6–15.1)
GFR SERPL CREATININE-BSD FRML MDRD: 88 ML/MIN/1.73SQ M
GLUCOSE SERPL-MCNC: 118 MG/DL (ref 65–140)
HCT VFR BLD AUTO: 38.2 % (ref 34.8–46.1)
HGB BLD-MCNC: 12.8 G/DL (ref 11.5–15.4)
MAGNESIUM SERPL-MCNC: 1.8 MG/DL (ref 1.9–2.7)
MCH RBC QN AUTO: 29.1 PG (ref 26.8–34.3)
MCHC RBC AUTO-ENTMCNC: 33.5 G/DL (ref 31.4–37.4)
MCV RBC AUTO: 87 FL (ref 82–98)
PHOSPHATE SERPL-MCNC: 2.6 MG/DL (ref 2.3–4.1)
PLATELET # BLD AUTO: 238 THOUSANDS/UL (ref 149–390)
PMV BLD AUTO: 9 FL (ref 8.9–12.7)
POTASSIUM SERPL-SCNC: 3.4 MMOL/L (ref 3.5–5.3)
PROT SERPL-MCNC: 7.4 G/DL (ref 6.4–8.4)
RBC # BLD AUTO: 4.4 MILLION/UL (ref 3.81–5.12)
SODIUM SERPL-SCNC: 138 MMOL/L (ref 135–147)
WBC # BLD AUTO: 9.45 THOUSAND/UL (ref 4.31–10.16)

## 2024-10-20 PROCEDURE — 47562 LAPAROSCOPIC CHOLECYSTECTOMY: CPT | Performed by: STUDENT IN AN ORGANIZED HEALTH CARE EDUCATION/TRAINING PROGRAM

## 2024-10-20 PROCEDURE — 83735 ASSAY OF MAGNESIUM: CPT

## 2024-10-20 PROCEDURE — 80053 COMPREHEN METABOLIC PANEL: CPT

## 2024-10-20 PROCEDURE — 88304 TISSUE EXAM BY PATHOLOGIST: CPT | Performed by: PATHOLOGY

## 2024-10-20 PROCEDURE — 85027 COMPLETE CBC AUTOMATED: CPT

## 2024-10-20 PROCEDURE — 0FT44ZZ RESECTION OF GALLBLADDER, PERCUTANEOUS ENDOSCOPIC APPROACH: ICD-10-PCS | Performed by: STUDENT IN AN ORGANIZED HEALTH CARE EDUCATION/TRAINING PROGRAM

## 2024-10-20 PROCEDURE — 84100 ASSAY OF PHOSPHORUS: CPT

## 2024-10-20 PROCEDURE — 93005 ELECTROCARDIOGRAM TRACING: CPT

## 2024-10-20 RX ORDER — LIDOCAINE HYDROCHLORIDE 10 MG/ML
INJECTION, SOLUTION EPIDURAL; INFILTRATION; INTRACAUDAL; PERINEURAL AS NEEDED
Status: DISCONTINUED | OUTPATIENT
Start: 2024-10-20 | End: 2024-10-20

## 2024-10-20 RX ORDER — POTASSIUM CHLORIDE 1500 MG/1
40 TABLET, EXTENDED RELEASE ORAL ONCE
Status: COMPLETED | OUTPATIENT
Start: 2024-10-20 | End: 2024-10-20

## 2024-10-20 RX ORDER — MEPERIDINE HYDROCHLORIDE 25 MG/ML
12.5 INJECTION INTRAMUSCULAR; INTRAVENOUS; SUBCUTANEOUS
Status: DISCONTINUED | OUTPATIENT
Start: 2024-10-20 | End: 2024-10-20 | Stop reason: HOSPADM

## 2024-10-20 RX ORDER — POTASSIUM CHLORIDE 14.9 MG/ML
20 INJECTION INTRAVENOUS
Status: DISCONTINUED | OUTPATIENT
Start: 2024-10-20 | End: 2024-10-20

## 2024-10-20 RX ORDER — ROCURONIUM BROMIDE 10 MG/ML
INJECTION, SOLUTION INTRAVENOUS AS NEEDED
Status: DISCONTINUED | OUTPATIENT
Start: 2024-10-20 | End: 2024-10-20

## 2024-10-20 RX ORDER — FENTANYL CITRATE 50 UG/ML
INJECTION, SOLUTION INTRAMUSCULAR; INTRAVENOUS AS NEEDED
Status: DISCONTINUED | OUTPATIENT
Start: 2024-10-20 | End: 2024-10-20

## 2024-10-20 RX ORDER — PROPOFOL 10 MG/ML
INJECTION, EMULSION INTRAVENOUS AS NEEDED
Status: DISCONTINUED | OUTPATIENT
Start: 2024-10-20 | End: 2024-10-20

## 2024-10-20 RX ORDER — FENTANYL CITRATE/PF 50 MCG/ML
25 SYRINGE (ML) INJECTION
Status: DISCONTINUED | OUTPATIENT
Start: 2024-10-20 | End: 2024-10-20 | Stop reason: HOSPADM

## 2024-10-20 RX ORDER — ONDANSETRON 2 MG/ML
INJECTION INTRAMUSCULAR; INTRAVENOUS AS NEEDED
Status: DISCONTINUED | OUTPATIENT
Start: 2024-10-20 | End: 2024-10-20

## 2024-10-20 RX ORDER — GLYCOPYRROLATE 0.2 MG/ML
INJECTION INTRAMUSCULAR; INTRAVENOUS AS NEEDED
Status: DISCONTINUED | OUTPATIENT
Start: 2024-10-20 | End: 2024-10-20

## 2024-10-20 RX ORDER — METRONIDAZOLE 500 MG/100ML
500 INJECTION, SOLUTION INTRAVENOUS EVERY 8 HOURS
Status: COMPLETED | OUTPATIENT
Start: 2024-10-20 | End: 2024-10-21

## 2024-10-20 RX ORDER — MAGNESIUM HYDROXIDE 1200 MG/15ML
LIQUID ORAL AS NEEDED
Status: DISCONTINUED | OUTPATIENT
Start: 2024-10-20 | End: 2024-10-20 | Stop reason: HOSPADM

## 2024-10-20 RX ORDER — ONDANSETRON 2 MG/ML
4 INJECTION INTRAMUSCULAR; INTRAVENOUS ONCE AS NEEDED
Status: DISCONTINUED | OUTPATIENT
Start: 2024-10-20 | End: 2024-10-20 | Stop reason: HOSPADM

## 2024-10-20 RX ORDER — HYDROMORPHONE HCL/PF 1 MG/ML
SYRINGE (ML) INJECTION AS NEEDED
Status: DISCONTINUED | OUTPATIENT
Start: 2024-10-20 | End: 2024-10-20

## 2024-10-20 RX ORDER — DEXAMETHASONE SODIUM PHOSPHATE 10 MG/ML
INJECTION, SOLUTION INTRAMUSCULAR; INTRAVENOUS AS NEEDED
Status: DISCONTINUED | OUTPATIENT
Start: 2024-10-20 | End: 2024-10-20

## 2024-10-20 RX ORDER — MAGNESIUM SULFATE HEPTAHYDRATE 40 MG/ML
2 INJECTION, SOLUTION INTRAVENOUS ONCE
Status: COMPLETED | OUTPATIENT
Start: 2024-10-20 | End: 2024-10-20

## 2024-10-20 RX ORDER — PROMETHAZINE HYDROCHLORIDE 25 MG/ML
12.5 INJECTION, SOLUTION INTRAMUSCULAR; INTRAVENOUS ONCE AS NEEDED
Status: DISCONTINUED | OUTPATIENT
Start: 2024-10-20 | End: 2024-10-20 | Stop reason: HOSPADM

## 2024-10-20 RX ORDER — SODIUM CHLORIDE, SODIUM GLUCONATE, SODIUM ACETATE, POTASSIUM CHLORIDE, MAGNESIUM CHLORIDE, SODIUM PHOSPHATE, DIBASIC, AND POTASSIUM PHOSPHATE .53; .5; .37; .037; .03; .012; .00082 G/100ML; G/100ML; G/100ML; G/100ML; G/100ML; G/100ML; G/100ML
100 INJECTION, SOLUTION INTRAVENOUS CONTINUOUS
Status: DISPENSED | OUTPATIENT
Start: 2024-10-20 | End: 2024-10-20

## 2024-10-20 RX ORDER — HYDROMORPHONE HCL/PF 1 MG/ML
0.25 SYRINGE (ML) INJECTION
Status: DISCONTINUED | OUTPATIENT
Start: 2024-10-20 | End: 2024-10-20 | Stop reason: HOSPADM

## 2024-10-20 RX ORDER — PROPOFOL 10 MG/ML
INJECTION, EMULSION INTRAVENOUS CONTINUOUS PRN
Status: DISCONTINUED | OUTPATIENT
Start: 2024-10-20 | End: 2024-10-20

## 2024-10-20 RX ORDER — SODIUM CHLORIDE, SODIUM LACTATE, POTASSIUM CHLORIDE, CALCIUM CHLORIDE 600; 310; 30; 20 MG/100ML; MG/100ML; MG/100ML; MG/100ML
INJECTION, SOLUTION INTRAVENOUS CONTINUOUS PRN
Status: DISCONTINUED | OUTPATIENT
Start: 2024-10-20 | End: 2024-10-20

## 2024-10-20 RX ORDER — POTASSIUM CHLORIDE 29.8 MG/ML
40 INJECTION INTRAVENOUS ONCE
Status: DISCONTINUED | OUTPATIENT
Start: 2024-10-20 | End: 2024-10-20

## 2024-10-20 RX ADMIN — SUGAMMADEX 200 MG: 100 INJECTION, SOLUTION INTRAVENOUS at 14:36

## 2024-10-20 RX ADMIN — OXYCODONE HYDROCHLORIDE 5 MG: 5 TABLET ORAL at 21:37

## 2024-10-20 RX ADMIN — PROPOFOL 30 MG: 10 INJECTION, EMULSION INTRAVENOUS at 14:36

## 2024-10-20 RX ADMIN — MAGNESIUM SULFATE HEPTAHYDRATE 2 G: 40 INJECTION, SOLUTION INTRAVENOUS at 17:50

## 2024-10-20 RX ADMIN — DEXMEDETOMIDINE 8 MCG: 100 INJECTION, SOLUTION INTRAVENOUS at 13:02

## 2024-10-20 RX ADMIN — ONDANSETRON 4 MG: 2 INJECTION INTRAMUSCULAR; INTRAVENOUS at 12:41

## 2024-10-20 RX ADMIN — METRONIDAZOLE 500 MG: 500 INJECTION, SOLUTION INTRAVENOUS at 00:05

## 2024-10-20 RX ADMIN — METRONIDAZOLE 500 MG: 500 INJECTION, SOLUTION INTRAVENOUS at 16:06

## 2024-10-20 RX ADMIN — FENTANYL CITRATE 25 MCG: 50 INJECTION INTRAMUSCULAR; INTRAVENOUS at 12:33

## 2024-10-20 RX ADMIN — ROCURONIUM BROMIDE 30 MG: 10 INJECTION INTRAVENOUS at 13:33

## 2024-10-20 RX ADMIN — POTASSIUM CHLORIDE 40 MEQ: 1500 TABLET, EXTENDED RELEASE ORAL at 19:55

## 2024-10-20 RX ADMIN — ROCURONIUM BROMIDE 10 MG: 10 INJECTION INTRAVENOUS at 14:12

## 2024-10-20 RX ADMIN — ENOXAPARIN SODIUM 40 MG: 40 INJECTION SUBCUTANEOUS at 09:02

## 2024-10-20 RX ADMIN — PROPOFOL 100 MCG/KG/MIN: 10 INJECTION, EMULSION INTRAVENOUS at 12:35

## 2024-10-20 RX ADMIN — PHENYLEPHRINE HYDROCHLORIDE 40 MCG/MIN: 50 INJECTION INTRAVENOUS at 12:35

## 2024-10-20 RX ADMIN — DEXMEDETOMIDINE 8 MCG: 100 INJECTION, SOLUTION INTRAVENOUS at 13:07

## 2024-10-20 RX ADMIN — FENTANYL CITRATE 50 MCG: 50 INJECTION INTRAMUSCULAR; INTRAVENOUS at 13:01

## 2024-10-20 RX ADMIN — CEFTRIAXONE 1000 MG: 1 INJECTION, POWDER, FOR SOLUTION INTRAMUSCULAR; INTRAVENOUS at 23:05

## 2024-10-20 RX ADMIN — GLYCOPYRROLATE 0.1 MG: 0.2 INJECTION INTRAMUSCULAR; INTRAVENOUS at 12:49

## 2024-10-20 RX ADMIN — DEXMEDETOMIDINE 4 MCG: 100 INJECTION, SOLUTION INTRAVENOUS at 13:19

## 2024-10-20 RX ADMIN — PROPOFOL 150 MG: 10 INJECTION, EMULSION INTRAVENOUS at 12:35

## 2024-10-20 RX ADMIN — GLYCOPYRROLATE 0.1 MG: 0.2 INJECTION INTRAMUSCULAR; INTRAVENOUS at 12:32

## 2024-10-20 RX ADMIN — HYDROMORPHONE HYDROCHLORIDE 0.5 MG: 1 INJECTION, SOLUTION INTRAMUSCULAR; INTRAVENOUS; SUBCUTANEOUS at 13:57

## 2024-10-20 RX ADMIN — HYDROMORPHONE HYDROCHLORIDE 0.2 MG: 0.2 INJECTION, SOLUTION INTRAMUSCULAR; INTRAVENOUS; SUBCUTANEOUS at 23:06

## 2024-10-20 RX ADMIN — SODIUM CHLORIDE, SODIUM LACTATE, POTASSIUM CHLORIDE, AND CALCIUM CHLORIDE: .6; .31; .03; .02 INJECTION, SOLUTION INTRAVENOUS at 12:29

## 2024-10-20 RX ADMIN — PANTOPRAZOLE SODIUM 40 MG: 40 TABLET, DELAYED RELEASE ORAL at 06:29

## 2024-10-20 RX ADMIN — LIDOCAINE HYDROCHLORIDE 50 MG: 10 INJECTION, SOLUTION EPIDURAL; INFILTRATION; INTRACAUDAL at 12:35

## 2024-10-20 RX ADMIN — FENTANYL CITRATE 25 MCG: 50 INJECTION INTRAMUSCULAR; INTRAVENOUS at 12:35

## 2024-10-20 RX ADMIN — METRONIDAZOLE 500 MG: 500 INJECTION, SOLUTION INTRAVENOUS at 08:58

## 2024-10-20 RX ADMIN — HYDROMORPHONE HYDROCHLORIDE 0.2 MG: 0.2 INJECTION, SOLUTION INTRAMUSCULAR; INTRAVENOUS; SUBCUTANEOUS at 18:39

## 2024-10-20 RX ADMIN — DEXMEDETOMIDINE 8 MCG: 100 INJECTION, SOLUTION INTRAVENOUS at 13:14

## 2024-10-20 RX ADMIN — DEXAMETHASONE SODIUM PHOSPHATE 10 MG: 10 INJECTION, SOLUTION INTRAMUSCULAR; INTRAVENOUS at 12:41

## 2024-10-20 RX ADMIN — Medication 2.5 MG: at 17:09

## 2024-10-20 RX ADMIN — PROPOFOL 40 MG: 10 INJECTION, EMULSION INTRAVENOUS at 13:57

## 2024-10-20 RX ADMIN — SODIUM CHLORIDE, SODIUM LACTATE, POTASSIUM CHLORIDE, AND CALCIUM CHLORIDE: .6; .31; .03; .02 INJECTION, SOLUTION INTRAVENOUS at 14:13

## 2024-10-20 RX ADMIN — SODIUM CHLORIDE, SODIUM GLUCONATE, SODIUM ACETATE, POTASSIUM CHLORIDE, MAGNESIUM CHLORIDE, SODIUM PHOSPHATE, DIBASIC, AND POTASSIUM PHOSPHATE 100 ML/HR: .53; .5; .37; .037; .03; .012; .00082 INJECTION, SOLUTION INTRAVENOUS at 00:46

## 2024-10-20 RX ADMIN — SODIUM CHLORIDE, SODIUM GLUCONATE, SODIUM ACETATE, POTASSIUM CHLORIDE, MAGNESIUM CHLORIDE, SODIUM PHOSPHATE, DIBASIC, AND POTASSIUM PHOSPHATE 100 ML/HR: .53; .5; .37; .037; .03; .012; .00082 INJECTION, SOLUTION INTRAVENOUS at 16:07

## 2024-10-20 RX ADMIN — DEXMEDETOMIDINE 12 MCG: 100 INJECTION, SOLUTION INTRAVENOUS at 12:56

## 2024-10-20 RX ADMIN — ROCURONIUM BROMIDE 50 MG: 10 INJECTION INTRAVENOUS at 12:35

## 2024-10-20 NOTE — ASSESSMENT & PLAN NOTE
Acute calculus cholecystitis; 2 days of pain.  - Admit to general surgery  - NPO/IVF  - Plan for laparoscopic cholecystectomy on 10/20  - EKG  - IV antibiotics; Rocephin/Flagyl  - Pain and nausea control as needed  - DVT prophylaxis

## 2024-10-20 NOTE — H&P
H&P - Surgery-General   Name: Ayesha Herrera 74 y.o. female I MRN: [id number]  Unit/Bed#: ED-28 I Date of Admission: 10/19/2024   Date of Service: 10/19/2024 I Hospital Day: 0     Assessment & Plan  Cholecystitis  Acute calculus cholecystitis; 2 days of pain.  - Admit to general surgery  - NPO/IVF  - Plan for laparoscopic cholecystectomy on 10/20  - EKG  - IV antibiotics; Rocephin/Flagyl  - Pain and nausea control as needed  - DVT prophylaxis    History of Present Illness   Ayesha Herrera is a 74 y.o. female with a past medical history of GERD and hypercholesterolemia with a prior open total hysterectomy and bilateral salpingo-oophorectomy who presents with 2 days of acute onset epigastric/right upper quadrant abdominal pain.  She reports the pain started the evening of 10/17 and was sharp in character.  She reports associated chills and nausea.  She reports that given that the pain was not resolving she came to the ED.  She additionally reports decreased appetite and worsening pain with eating.  She denies fevers, vomiting, chest pain, shortness of breath, or dysuria.  She denies any previous episodes similar to this in the past.  She denies the use of any anticoagulation or antiplatelet therapy.    Review of Systems  As stated in the above HPI; otherwise negative      Objective :  Temp:  [98.8 °F (37.1 °C)] 98.8 °F (37.1 °C)  HR:  [] 94  BP: (145-188)/(69-90) 145/69  Resp:  [18] 18  SpO2:  [95 %-100 %] 95 %  O2 Device: None (Room air)      Physical Exam  General: NAD  Skin: Warm, dry, anicteric  HEENT: Normocephalic, atraumatic  CV: RRR  Pulm:  Nonlabored breathing on room air  Abd: Obese; soft, moderate right upper quadrant tenderness, nondistended  MSK: Symmetric, no edema, no tenderness, no deformity  Neuro: AOx3, GCS 15     Lab Results: I have reviewed the following results:  Recent Labs     10/19/24  1931   WBC 10.64*   HGB 13.0   HCT 38.9      SODIUM 135   K 3.7      CO2 23   BUN 12    CREATININE 0.57*   GLUC 107   AST 12*   ALT 14   ALB 4.2   TBILI 0.84   ALKPHOS 48       Imaging Results Review: I personally reviewed the following image studies/reports in PACS and discussed pertinent findings with Radiology: CT abdomen/pelvis. My interpretation of the radiology images/reports is: Pericholecystic inflammatory changes with calcified gallstones.      VTE Pharmacologic Prophylaxis: Sequential compression device (Venodyne)  and Enoxaparin (Lovenox)  VTE Mechanical Prophylaxis: sequential compression device

## 2024-10-20 NOTE — ANESTHESIA POSTPROCEDURE EVALUATION
Post-Op Assessment Note    CV Status:  Stable  Pain Score: 0    Pain management: adequate    Multimodal analgesia used between 6 hours prior to anesthesia start to PACU discharge    Mental Status:  Alert and awake   Hydration Status:  Euvolemic   PONV Controlled:  Controlled   Airway Patency:  Patent  Two or more mitigation strategies used for obstructive sleep apnea   Post Op Vitals Reviewed: Yes    No anethesia notable event occurred.    Staff: CRNA           Last Filed PACU Vitals:  Vitals Value Taken Time   Temp 98.3 °F (36.8 °C) 10/20/24 1453   Pulse 72 10/20/24 1453   BP 85/51 10/20/24 1453   Resp 18 10/20/24 1453   SpO2 99 % 10/20/24 1453       Modified Jayne:  Activity: 2 (10/20/2024  2:53 PM)  Respiration: 2 (10/20/2024  2:53 PM)  Circulation: 2 (10/20/2024  2:53 PM)  Consciousness: 1 (10/20/2024  2:53 PM)  Oxygen Saturation: 1 (10/20/2024  2:53 PM)  Modified Jayne Score: 8 (10/20/2024  2:53 PM)

## 2024-10-20 NOTE — ANESTHESIA PREPROCEDURE EVALUATION
Procedure:  CHOLECYSTECTOMY LAPAROSCOPIC (Abdomen)    Relevant Problems   No relevant active problems      Acute calculous chelecystitis  Physical Exam    Airway    Mallampati score: II  TM Distance: >3 FB  Neck ROM: full     Dental   No notable dental hx     Cardiovascular  Cardiovascular exam normal    Pulmonary  Pulmonary exam normal     Other Findings  post-pubertal.    Hb 13    Wbc 10.6      Anesthesia Plan  ASA Score- 2     Anesthesia Type- general with ASA Monitors.         Additional Monitors:     Airway Plan: ETT.           Plan Factors-Exercise tolerance (METS): >4 METS.    Chart reviewed.  Imaging results reviewed. Existing labs reviewed. Patient summary reviewed.    Patient is not a current smoker.      Obstructive sleep apnea risk education given perioperatively.        Induction- intravenous.    Postoperative Plan-     Perioperative Resuscitation Plan - Level 1 - Full Code.       Informed Consent- Anesthetic plan and risks discussed with patient.  I personally reviewed this patient with the CRNA. Discussed and agreed on the Anesthesia Plan with the CRNA..

## 2024-10-20 NOTE — PLAN OF CARE
Problem: PAIN - ADULT  Goal: Verbalizes/displays adequate comfort level or baseline comfort level  Description: Interventions:  - Encourage patient to monitor pain and request assistance  - Assess pain using appropriate pain scale  - Administer analgesics based on type and severity of pain and evaluate response  - Implement non-pharmacological measures as appropriate and evaluate response  - Consider cultural and social influences on pain and pain management  - Notify physician/advanced practitioner if interventions unsuccessful or patient reports new pain  Outcome: Progressing     Problem: INFECTION - ADULT  Goal: Absence or prevention of progression during hospitalization  Description: INTERVENTIONS:  - Assess and monitor for signs and symptoms of infection  - Monitor lab/diagnostic results  - Monitor all insertion sites, i.e. indwelling lines, tubes, and drains  - Monitor endotracheal if appropriate and nasal secretions for changes in amount and color  - [place] appropriate cooling/warming therapies per order  - Administer medications as ordered  - Instruct and encourage patient and family to use good hand hygiene technique  - Identify and instruct in appropriate isolation precautions for identified infection/condition  Outcome: Progressing

## 2024-10-20 NOTE — OP NOTE
OPERATIVE REPORT  PATIENT NAME: Ayesha Herrera    :  1950  MRN: [id number]  Pt Location: AN OR ROOM 03    SURGERY DATE: 10/20/2024    Surgeons and Role:     * Lauryn Ullrich, DO - Primary     * Sonia Lazo MD - Assisting    Preop Diagnosis:  Cholecystitis, acute [K81.0]    Post-Op Diagnosis Codes:     * Cholecystitis, acute [K81.0]    Procedure(s):  CHOLECYSTECTOMY LAPAROSCOPIC. lysis of adhesions    Specimen(s):  ID Type Source Tests Collected by Time Destination   1 :  Tissue Gallbladder TISSUE EXAM Lauryn Ullrich, DO 10/20/2024 1444        Estimated Blood Loss:   100 mL    Drains:  * No LDAs found *    Anesthesia Type:   General    Operative Indications:  Cholecystitis, acute [K81.0]      Operative Findings:  Dense omentum adhered to anterior abdominal wall requiring takedown to gain exposure with lysis of adhesions occurring for >30 minutes  Grossly hyperemic and inflamed gallbladder with thickened rind  Needle decompression of gallbladder revealing hydrops, pus and thickened, purulent bile  Successful identification of cystic artery and cystic duct doubly clipped       Complications:   None    Procedure and Technique:  The patient was brought to the operating room and identified verbally and via wristband. She was transferred to the operating room table and positioned supine. General endotracheal anesthesia was induced successfully. The patient's abdomen was prepped and draped in the usual sterile fashion and SCDs turned on and running. A time out was performed confirming the patient, procedure, and site.  All parties were in agreement.    An incision was made in the natural skin line above the umbilicus.  The fascia was elevated and a Veress needle inserted.  Due to inability to confirm proper positioning with saline meniscus test, decision was made to then proceed with varus entry at Barone's point.  A skin nick was made at Barone's point and Veress needle inserted successfully.  Proper  positioning was confirmed with saline meniscus test.  The abdomen was insufflated with carbon dioxide to a pressure of 12-15 mmHg. The patient tolerated insufflation well.  A 5 mm trocar and laparoscope were inserted in the supraumbilical incision using Optiview technique.  Upon entry, dense adhesions were noted along the anterior abdominal wall obscuring our view into the right upper quadrant.  Additional trocars were placed as followed: 10 mm subxiphoid trocar, 2 5mm trocal in the right upper quadrant. Adhesions to the anterior abdominal wall were taken down to allow for camera access through the supraumbilical port. All trocar insertion sites were examined as well as left upper quadrant Veress site for injuries. No injuries were noted. The table was then placed in reverse Trendelenburg position with right side up.    With the laparoscope inserted the right upper quadrant was inspected.  The gallbladder was noted to be densely adhered to the liver, omentum and transverse colon.  The gallbladder was noted to be hyperemic, grossly inflamed and covered with a thickened rind along the hepatocystic triangle. On attempted retraction of the gallbladder over the liver, it was noted to be tensely distended. A laparoscopic needle connected to suction tubing was used to puncture the gallbladder wall and gallbladder and contents drained which appeared consistent with hydrops and purulent bile. With the gallbladder decompressed, the needle was removed.    The dome of the gallbladder was grasped with an atraumatic grasper and retracted over the dome of the liver.  The infundibulum and hepatocystic triangle were densely adhered without visualization of the cystic duct or cystic artery.  Further dissection of adhesions occurred using a [place] grasper and suction to remove thickened rind and peritoneum off of the gallbladder.  Raw surface oozing was appreciated and hemostasis achieved using Bovie electrocautery.  Once adhesions  and peritoneum were freed, a view of the cystic duct and cystic artery were appreciated.  Upon grasping the infundibulum, the inflamed and thin gallbladder wall tore with notable drainage of purulent, thickened bile.  This was immediately suctioned from the abdomen.    With both the cystic duct and artery exposed, the cystic duct was doubly clipped and divided close to the gallbladder.  The cystic artery was then doubly clipped and divided close to the gallbladder.  The gallbladder was then dissected from its peritoneal attachments by electrocautery.  The gallbladder was placed in an endoscopic retrieval bag through the 10 mm port.  Hemostasis was achieved using Bovie electrocautery.  The gallbladder fossa was copiously irrigated with saline and hemostasis assured.  There was no evidence of bleeding from the gallbladder fossa or cystic artery or leakage of the bile from the cystic duct stump.  The right upper quadrant was again copiously irrigated.    The gallbladder and the endoscopic retrieval bag was removed through the 10 mm port and passed off the field the specimen.  Secondary trocars removed under direct visualization.  No bleeding was noted at trocar sites.  The laparoscope was withdrawn and the Bilyk with trocar removed.  The abdomen was allowed to collapse.  Using a suture passer, fascia of the 10 mm port is closed using 0 Vicryl suture.  All trocar sites were closed with 4-0 Monocryl in a subcuticular fashion and covered with Exofin glue.    The patient was then allowed to awaken, extubated, and transferred to the PACU having tolerated the procedure well. All instrument, needle, and sponge counts were correct at the end of the case. Radiofrequency detection was negative.    Dr. Ullrich was present for the entire procedure         Patient Disposition:  PACU              SIGNATURE: Sonia Lazo MD  DATE: October 20, 2024  TIME: 2:53 PM                 [de-identified] : Urgent care follow up for Adenovirus (Cough, vomiting) [[email or web address]] : 5 yo M presenting for FUV. He started to feel sick 8 days ago with coughing and vomiting of G tube feeds. He had fevers and rhinorrhea at the time. Now afebrile >48 hrs. He was seen in Urgent Care on thursday, and tested positive for Adenovirus. He has not re-started feeds, but is tolerating water and Pedialyte by mouth, after taking Delsym for his cough. Voiding well.

## 2024-10-20 NOTE — PLAN OF CARE
Problem: PAIN - ADULT  Goal: Verbalizes/displays adequate comfort level or baseline comfort level  Description: Interventions:  - Encourage patient to monitor pain and request assistance  - Assess pain using appropriate pain scale  - Administer analgesics based on type and severity of pain and evaluate response  - Implement non-pharmacological measures as appropriate and evaluate response  - Consider cultural and social influences on pain and pain management  - Notify physician/advanced practitioner if interventions unsuccessful or patient reports new pain  Outcome: Progressing     Problem: INFECTION - ADULT  Goal: Absence or prevention of progression during hospitalization  Description: INTERVENTIONS:  - Assess and monitor for signs and symptoms of infection  - Monitor lab/diagnostic results  - Monitor all insertion sites, i.e. indwelling lines, tubes, and drains  - Monitor endotracheal if appropriate and nasal secretions for changes in amount and color  - [place] appropriate cooling/warming therapies per order  - Administer medications as ordered  - Instruct and encourage patient and family to use good hand hygiene technique  - Identify and instruct in appropriate isolation precautions for identified infection/condition  Outcome: Progressing

## 2024-10-20 NOTE — QUICK NOTE
Postop Check    Procedure: Laparoscopic cholecystectomy    Subjective:  No acute distress.  Resting comfortably in bed.  Reports some in the right upper quadrant.  She denies any nausea, vomiting, fever, chills, chest pain, shortness of breath.  She has been tolerating diet.  She has not voided since surgery.    Objective  Vitals:    10/20/24 1525 10/20/24 1658 10/20/24 1804 10/20/24 1903   BP: 114/56 128/74 128/73 134/78   BP Location:       Pulse: 93 82 83 85   Resp: 18   16   Temp: 97.6 °F (36.4 °C)   97.7 °F (36.5 °C)   TempSrc:       SpO2: 95% 98% 98% 98%   Weight:       Height:             GENERAL: No acute distress, resting comfortably in bed  CV: Regular rate, appears well-perfused  LUNGS: Nonlabored respirations, normal work of breathing  ABDOMEN: Abdomen soft, minimal tenderness in the right upper quadrant, nondistended.  MSK: No edema bilaterally  NEURO: A&O x3    Assessment and Plan:  Status post laparoscopic cholecystectomy.  Patient is overall doing well and is hemodynamically stable.    Continue IV antibiotics for 24 hours  Likely discharge home 10/21  IV fluids  Incentive spirometry  DVT prophylaxis  Analgesia  Monitor and replete electrolytes    Rick Wolfe MD

## 2024-10-21 VITALS
TEMPERATURE: 98.6 F | HEIGHT: 58 IN | BODY MASS INDEX: 28.92 KG/M2 | WEIGHT: 137.79 LBS | DIASTOLIC BLOOD PRESSURE: 76 MMHG | SYSTOLIC BLOOD PRESSURE: 120 MMHG | OXYGEN SATURATION: 93 % | HEART RATE: 92 BPM | RESPIRATION RATE: 18 BRPM

## 2024-10-21 LAB
ALBUMIN SERPL BCG-MCNC: 3.3 G/DL (ref 3.5–5)
ALP SERPL-CCNC: 42 U/L (ref 34–104)
ALT SERPL W P-5'-P-CCNC: 34 U/L (ref 7–52)
ANION GAP SERPL CALCULATED.3IONS-SCNC: 8 MMOL/L (ref 4–13)
AST SERPL W P-5'-P-CCNC: 36 U/L (ref 13–39)
ATRIAL RATE: 86 BPM
BASOPHILS # BLD AUTO: 0.01 THOUSANDS/ΜL (ref 0–0.1)
BASOPHILS NFR BLD AUTO: 0 % (ref 0–1)
BILIRUB SERPL-MCNC: 0.49 MG/DL (ref 0.2–1)
BUN SERPL-MCNC: 12 MG/DL (ref 5–25)
CALCIUM ALBUM COR SERPL-MCNC: 8.9 MG/DL (ref 8.3–10.1)
CALCIUM SERPL-MCNC: 8.3 MG/DL (ref 8.4–10.2)
CHLORIDE SERPL-SCNC: 104 MMOL/L (ref 96–108)
CO2 SERPL-SCNC: 23 MMOL/L (ref 21–32)
CREAT SERPL-MCNC: 0.52 MG/DL (ref 0.6–1.3)
EOSINOPHIL # BLD AUTO: 0.05 THOUSAND/ΜL (ref 0–0.61)
EOSINOPHIL NFR BLD AUTO: 1 % (ref 0–6)
ERYTHROCYTE [DISTWIDTH] IN BLOOD BY AUTOMATED COUNT: 12.5 % (ref 11.6–15.1)
GFR SERPL CREATININE-BSD FRML MDRD: 93 ML/MIN/1.73SQ M
GLUCOSE SERPL-MCNC: 132 MG/DL (ref 65–140)
HCT VFR BLD AUTO: 31.7 % (ref 34.8–46.1)
HGB BLD-MCNC: 10.5 G/DL (ref 11.5–15.4)
IMM GRANULOCYTES # BLD AUTO: 0.02 THOUSAND/UL (ref 0–0.2)
IMM GRANULOCYTES NFR BLD AUTO: 0 % (ref 0–2)
LYMPHOCYTES # BLD AUTO: 0.9 THOUSANDS/ΜL (ref 0.6–4.47)
LYMPHOCYTES NFR BLD AUTO: 12 % (ref 14–44)
MCH RBC QN AUTO: 29 PG (ref 26.8–34.3)
MCHC RBC AUTO-ENTMCNC: 33.1 G/DL (ref 31.4–37.4)
MCV RBC AUTO: 88 FL (ref 82–98)
MONOCYTES # BLD AUTO: 0.87 THOUSAND/ΜL (ref 0.17–1.22)
MONOCYTES NFR BLD AUTO: 11 % (ref 4–12)
NEUTROPHILS # BLD AUTO: 5.97 THOUSANDS/ΜL (ref 1.85–7.62)
NEUTS SEG NFR BLD AUTO: 76 % (ref 43–75)
NRBC BLD AUTO-RTO: 0 /100 WBCS
P AXIS: 79 DEGREES
PLATELET # BLD AUTO: 209 THOUSANDS/UL (ref 149–390)
PMV BLD AUTO: 9.7 FL (ref 8.9–12.7)
POTASSIUM SERPL-SCNC: 4 MMOL/L (ref 3.5–5.3)
PR INTERVAL: 186 MS
PROT SERPL-MCNC: 6.1 G/DL (ref 6.4–8.4)
QRS AXIS: 40 DEGREES
QRSD INTERVAL: 74 MS
QT INTERVAL: 394 MS
QTC INTERVAL: 471 MS
RBC # BLD AUTO: 3.62 MILLION/UL (ref 3.81–5.12)
SODIUM SERPL-SCNC: 135 MMOL/L (ref 135–147)
T WAVE AXIS: 23 DEGREES
VENTRICULAR RATE: 86 BPM
WBC # BLD AUTO: 7.82 THOUSAND/UL (ref 4.31–10.16)

## 2024-10-21 PROCEDURE — 85025 COMPLETE CBC W/AUTO DIFF WBC: CPT

## 2024-10-21 PROCEDURE — NC001 PR NO CHARGE: Performed by: SURGERY

## 2024-10-21 PROCEDURE — 80053 COMPREHEN METABOLIC PANEL: CPT

## 2024-10-21 PROCEDURE — 93010 ELECTROCARDIOGRAM REPORT: CPT | Performed by: INTERNAL MEDICINE

## 2024-10-21 PROCEDURE — 99024 POSTOP FOLLOW-UP VISIT: CPT | Performed by: SURGERY

## 2024-10-21 RX ORDER — CEFPODOXIME PROXETIL 200 MG/1
200 TABLET, FILM COATED ORAL 2 TIMES DAILY
Qty: 6 TABLET | Refills: 0 | Status: SHIPPED | OUTPATIENT
Start: 2024-10-21 | End: 2024-10-24

## 2024-10-21 RX ORDER — OXYCODONE HYDROCHLORIDE 5 MG/1
2.5 TABLET ORAL EVERY 6 HOURS PRN
Qty: 4 TABLET | Refills: 0 | Status: SHIPPED | OUTPATIENT
Start: 2024-10-21

## 2024-10-21 RX ORDER — METRONIDAZOLE 500 MG/1
500 TABLET ORAL EVERY 8 HOURS SCHEDULED
Qty: 9 TABLET | Refills: 0 | Status: SHIPPED | OUTPATIENT
Start: 2024-10-21 | End: 2024-10-24

## 2024-10-21 RX ADMIN — METRONIDAZOLE 500 MG: 500 INJECTION, SOLUTION INTRAVENOUS at 08:15

## 2024-10-21 RX ADMIN — PANTOPRAZOLE SODIUM 40 MG: 40 TABLET, DELAYED RELEASE ORAL at 05:31

## 2024-10-21 RX ADMIN — METRONIDAZOLE 500 MG: 500 INJECTION, SOLUTION INTRAVENOUS at 00:09

## 2024-10-21 RX ADMIN — OXYCODONE HYDROCHLORIDE 5 MG: 5 TABLET ORAL at 11:11

## 2024-10-21 RX ADMIN — CEFTRIAXONE 1000 MG: 1 INJECTION, POWDER, FOR SOLUTION INTRAMUSCULAR; INTRAVENOUS at 13:12

## 2024-10-21 RX ADMIN — ENOXAPARIN SODIUM 40 MG: 40 INJECTION SUBCUTANEOUS at 08:15

## 2024-10-21 RX ADMIN — OXYCODONE HYDROCHLORIDE 5 MG: 5 TABLET ORAL at 05:31

## 2024-10-21 RX ADMIN — HYDROMORPHONE HYDROCHLORIDE 0.2 MG: 0.2 INJECTION, SOLUTION INTRAMUSCULAR; INTRAVENOUS; SUBCUTANEOUS at 08:15

## 2024-10-21 NOTE — UTILIZATION REVIEW
Initial Clinical Review    Admission: Date/Time/Statement:   Admission Orders (From admission, onward)       Ordered        10/19/24 2339  Inpatient Admission  Once                          Orders Placed This Encounter   Procedures    Inpatient Admission     Standing Status:   Standing     Number of Occurrences:   1     Order Specific Question:   Level of Care     Answer:   Med Surg [16]     Order Specific Question:   Estimated length of stay     Answer:   Inpatient Only Surgery     ED Arrival Information       Expected   -    Arrival   10/19/2024 18:49    Acuity   Urgent              Means of arrival   Walk-In    Escorted by   Family Member    Service   Surgery-General    Admission type   Emergency              Arrival complaint   Stomach Pain             Chief Complaint   Patient presents with    Abdominal Pain     Pt reports abdominal pain and decreased appetite for the past two days. Denies n/v/d. Pt states she feels very full and is interrupting her sleep.        Initial Presentation: 74 y.o. female with hx GERD, HLD, total hysterectomy and bilateral salpingo-oophorectomy who presents to ED from home with 2 days of acute onset epigastric/right upper quadrant abdominal pain. She reports the pain started the evening of 10/17 and was sharp in character,  associated chills and nausea.Pt  additionally reports decreased appetite and worsening pain with eating. On exam, pt tachycardic. Abdomen soft, moderate RUQ tenderness, non distended. Labs WBC 10.64. CT A/P shows acute calculous cholecystitis. Pt given IVF< IV analgesic, IV antiemetic, IV abx in ED> Admitted as Inpatient by general sx with cholecystits. Plan- NPO. IVF. PRN antiemetics. IV abx- Rocephin and Flagyl. Pain control. To  OR 10/20 . DVT ppx.      Date: 10/20   Day 2:     10/20/24 @ 1254   CHOLECYSTECTOMY LAPAROSCOPIC. lysis of adhesions    General anesthesia   Operative Findings:  Dense omentum adhered to anterior abdominal wall requiring takedown to  gain exposure with lysis of adhesions occurring for >30 minutes  Grossly hyperemic and inflamed gallbladder with thickened rind  Needle decompression of gallbladder revealing hydrops, pus and thickened, purulent bile  Successful identification of cystic artery and cystic duct doubly clipped     Per general sx- pt to continue on IV abx for 24 hrs. Pt hemodynamically stable .Abdomen soft, minimal tenderness in the right upper quadrant, nondistended.  Continue IVF.K 3.4, mag 1.8 .  Monitor and replete lytes. IVF. Start reg diet     Date: 10/21 POD #1 Day 3: Has surpassed a 2nd midnight with active treatments and services.  Pt doing well. Has mild RUQ abdominal tenderness, pt receiving prn IV Dilaudid, prn Oxycodone for pain past 24 hrs . Pt denies N/V. Tolerating diet. IVF d/c'ed last night . Pt voiding well.   IV Ceftriaxone  . WBC and K WNL .         ED Treatment-Medication Administration from 10/19/2024 1849 to 10/20/2024 0111         Date/Time Order Dose Route Action     10/19/2024 1953 sodium chloride 0.9 % bolus 1,000 mL 1,000 mL Intravenous New Bag     10/19/2024 1953 ketorolac (TORADOL) injection 15 mg 15 mg Intravenous Given     10/19/2024 1953 Famotidine (PF) (PEPCID) injection 20 mg 20 mg Intravenous Given     10/19/2024 2018 iohexol (OMNIPAQUE) 350 MG/ML injection (MULTI-DOSE) 85 mL 85 mL Intravenous Given     10/19/2024 2229 ceftriaxone (ROCEPHIN) 1 g/50 mL in dextrose IVPB 1,000 mg Intravenous New Bag     10/19/2024 2237 HYDROmorphone (DILAUDID) injection 0.5 mg 0.5 mg Intravenous Given     10/20/2024 0046 multi-electrolyte (PLASMALYTE-A/ISOLYTE-S PH 7.4) IV solution 100 mL/hr Intravenous New Bag     10/20/2024 0005 metroNIDAZOLE (FLAGYL) IVPB (premix) 500 mg 100 mL 500 mg Intravenous New Bag            Scheduled Medications:  cefTRIAXone, 1,000 mg, Intravenous, Once x1 10/21 @ 1400  enoxaparin, 40 mg, Subcutaneous, Daily  pantoprazole, 40 mg, Oral, Early Morning    potassium chloride (Klor-Con M20) CR  tablet 40 mEq  Dose: 40 mEq  Freq: Once Route: PO  Start: 10/20/24 1900 End: 10/20/24 1955  metroNIDAZOLE (FLAGYL) IVPB (premix) 500 mg 100 mL  Dose: 500 mg  Freq: Every 8 hours Route: IV  Last Dose: 500 mg (10/20/24 0858)  Start: 10/20/24 0000 End: 10/20/24 1456  metroNIDAZOLE (FLAGYL) IVPB (premix) 500 mg 100 mL  Dose: 500 mg  Freq: Every 8 hours Route: IV  Last Dose: 500 mg (10/21/24 0815)  Start: 10/20/24 1600 End: 10/21/24 0845  magnesium sulfate 2 g/50 mL IVPB (premix) 2 g  Dose: 2 g  Freq: Once Route: IV  Last Dose: 2 g (10/20/24 1750)  Start: 10/20/24 1115 End: 10/20/24 1950  ceftriaxone (ROCEPHIN) 1 g/50 mL in dextrose IVPB  Dose: 1,000 mg  Freq: Every 24 hours Route: IV  Last Dose: Stopped (10/21/24 0010)  Start: 10/20/24 2230 End: 10/21/24 0010    Continuous IV Infusions:   multi-electrolyte (PLASMALYTE-A/ISOLYTE-S PH 7.4) IV solution  Rate: 100 mL/hr Dose: 100 mL/hr  Freq: Continuous Route: IV  Last Dose: Stopped (10/20/24 2134)  PRN Meds:  acetaminophen, 650 mg, Oral, Q6H PRN  HYDROmorphone, 0.2 mg, Intravenous, Q3H PRN x2 10/20, x 1 10/21   ondansetron, 4 mg, Intravenous, Q6H PRN  oxyCODONE, 5 mg, Oral, Q4H PRN x1 10/20,.x1 10/21   oxyCODONE, 2.5 mg, Oral, Q4H PRN x1 10/20      ED Triage Vitals [10/19/24 1904]   Temperature Pulse Respirations Blood Pressure SpO2 Pain Score   98.8 °F (37.1 °C) (!) 109 18 (!) 188/90 100 % 6     Weight (last 2 days)       Date/Time Weight    10/19/24 1904 62.5 (137.79)            Vital Signs (last 3 days)       Date/Time Temp Pulse Resp BP MAP (mmHg) SpO2 O2 Flow Rate (L/min) O2 Device Cardiac (WDL) Patient Position - Orthostatic VS [place] Coma Scale Score Pain    10/21/24 0815 -- -- -- -- -- -- -- -- -- -- -- 5    10/21/24 07:34:58 98.6 °F (37 °C) 92 18 120/76 91 93 % -- -- -- -- -- --    10/21/24 0531 -- -- -- -- -- -- -- -- -- -- -- 6    10/20/24 23:44:26 98.1 °F (36.7 °C) 90 18 123/74 90 96 % -- -- -- -- -- --    10/20/24 2306 -- -- -- -- -- -- -- -- -- -- -- 7     10/20/24 2137 -- -- -- -- -- -- -- -- -- -- -- 8    10/20/24 1958 -- -- -- -- -- -- 2 L/min Nasal cannula -- -- 15 6    10/20/24 19:03:13 97.7 °F (36.5 °C) 85 16 134/78 97 98 % -- -- -- -- -- --    10/20/24 1839 -- -- -- -- -- -- -- -- -- -- -- 7    10/20/24 18:04:23 -- 83 -- 128/73 91 98 % -- -- -- -- -- --    10/20/24 1709 -- -- -- -- -- -- -- -- -- -- -- 5    10/20/24 16:58:40 -- 82 -- 128/74 92 98 % -- -- -- -- -- --    10/20/24 1525 97.6 °F (36.4 °C) 93 18 114/56 -- 95 % 2 L/min Nasal cannula -- -- 15 No Pain    10/20/24 1515 97.6 °F (36.4 °C) 95 18 97/52 -- 95 % -- None (Room air) -- -- 14 No Pain    10/20/24 1510 98.3 °F (36.8 °C) 92 18 112/54 -- 100 % 5 L/min Simple mask -- -- 13 No Pain    10/20/24 1500 98.3 °F (36.8 °C) 72 18 136/68 -- 99 % 5 L/min Simple mask -- -- 13 No Pain    10/20/24 1453 98.3 °F (36.8 °C) 72 18 85/51 -- 99 % 5 L/min Simple mask X -- 13 No Pain    10/20/24 1149 99 °F (37.2 °C) 104 18 154/74 -- 98 % -- None (Room air) -- -- -- --    10/20/24 0800 -- -- -- -- -- -- -- -- -- -- -- No Pain    10/20/24 07:42:56 99.1 °F (37.3 °C) 103 16 155/86 109 98 % -- -- -- -- -- --    10/20/24 0156 -- -- -- -- -- -- -- None (Room air) -- -- -- No Pain    10/20/24 0152 -- -- -- -- -- -- -- -- -- -- -- No Pain    10/20/24 0100 -- 80 -- 119/66 87 95 % -- -- -- -- -- --    10/20/24 0051 -- 83 18 119/66 -- 96 % -- None (Room air) -- Sitting -- --    10/20/24 0002 -- -- -- -- -- -- -- -- -- -- -- 2    10/19/24 2237 -- -- -- -- -- -- -- -- -- -- -- 4    10/19/24 2219 -- 94 18 145/69 99 95 % -- None (Room air) -- Lying -- --    10/19/24 2043 -- 87 18 157/74 -- 99 % -- None (Room air) -- -- -- --    10/19/24 2042 -- -- -- -- -- -- -- -- -- -- -- 2    10/19/24 1933 -- -- -- -- -- -- -- -- -- -- -- 7    10/19/24 1930 -- -- -- -- -- -- -- None (Room air) -- -- -- --    10/19/24 1904 98.8 °F (37.1 °C) 109 18 188/90 126 100 % -- None (Room air) -- Sitting -- 6              Pertinent Labs/Diagnostic Test Results:    Radiology:  CT abdomen pelvis with contrast   Final Interpretation by Yon Ramesh MD (10/19 2217)      1.  Acute calculus cholecystitis.      2.  Mild hepatic steatosis.      The study was marked in EPIC for immediate notification.         Workstation performed: [id number]           Cardiology:  ECG 12 lead   Final Result by Sandra Guajardo MD (10/21 0909)   Normal sinus rhythm   Nonspecific T wave abnormality   Abnormal ECG   No previous ECGs available   Confirmed by Sandra Guajardo [id number]) on 10/21/2024 9:09:23 AM              Results from last 7 days   Lab Units 10/21/24  0440 10/20/24  0641 10/19/24  1931   WBC Thousand/uL 7.82 9.45 10.64*   HEMOGLOBIN g/dL 10.5* 12.8 13.0   HEMATOCRIT % 31.7* 38.2 38.9   PLATELETS Thousands/uL 209 238 248   TOTAL NEUT ABS Thousands/µL 5.97  --  8.11*         Results from last 7 days   Lab Units 10/21/24  0440 10/20/24  0641 10/19/24  1931   SODIUM mmol/L 135 138 135   POTASSIUM mmol/L 4.0 3.4* 3.7   CHLORIDE mmol/L 104 105 104   CO2 mmol/L 23 26 23   ANION GAP mmol/L 8 7 8   BUN mg/dL 12 12 12   CREATININE mg/dL 0.52* 0.61 0.57*   EGFR ml/min/1.73sq m 93 88 90   CALCIUM mg/dL 8.3* 9.3 9.5   MAGNESIUM mg/dL  --  1.8*  --    PHOSPHORUS mg/dL  --  2.6  --      Results from last 7 days   Lab Units 10/21/24  0440 10/20/24  0641 10/19/24  1931   AST U/L 36 12* 12*   ALT U/L 34 15 14   ALK PHOS U/L 42 47 48   TOTAL PROTEIN g/dL 6.1* 7.4 7.2   ALBUMIN g/dL 3.3* 4.1 4.2   TOTAL BILIRUBIN mg/dL 0.49 0.71 0.84         Results from last 7 days   Lab Units 10/21/24  0440 10/20/24  0641 10/19/24  1931   GLUCOSE RANDOM mg/dL 132 118 107                       Results from last 7 days   Lab Units 10/19/24  1931   LIPASE u/L 6*             Past Medical History:   Diagnosis Date    Hypercholesterolemia      Present on Admission:  **None**      Admitting Diagnosis: Cholecystitis, acute [K81.0]  Stomach pain [R10.9]  Abdominal pain [R10.9]  Age/Sex: 74 y.o. female    Network Utilization  Review Department  ATTENTION: Please call with any questions or concerns to [phone] and carefully listen to the prompts so that you are directed to the right person. All voicemails are confidential.   For Discharge needs, contact Care Management DC Support Team at [phone] opt. 2  Send all requests for admission clinical reviews, approved or denied determinations and any other requests to dedicated fax number below belonging to the campus where the patient is receiving treatment. List of dedicated fax numbers for the Facilities:  FACILITY NAME UR FAX NUMBER   ADMISSION DENIALS (Administrative/Medical Necessity) [phone]   DISCHARGE SUPPORT TEAM (NETWORK) [phone]   PARENT CHILD HEALTH (Maternity/NICU/Pediatrics) [phone]   [facility] [phone]   [facility] [phone]   [facility] [phone]   [facility] [phone]   [facility] [phone]   [facility] [phone]   [facility] [phone]   [facility] [phone]   [facility] [phone]   [facility] [phone]   [facility] [phone]   [facility] [phone]

## 2024-10-21 NOTE — ASSESSMENT & PLAN NOTE
Patient is a 74-year-old female past medical history of GERD and hypercholesterolemia who presented with acute calculus cholecystitis after 2 days of pain.  She is now status post laparoscopic cholecystectomy on 10/20.  She is overall doing well this morning is hemodynamically stable.      -Continue antibiotics for 24 hours postoperatively  - Possible discharge home today  - Pain and nausea control as needed  - DVT prophylaxis  - Regular diet

## 2024-10-21 NOTE — PLAN OF CARE
Problem: PAIN - ADULT  Goal: Verbalizes/displays adequate comfort level or baseline comfort level  Description: Interventions:  - Encourage patient to monitor pain and request assistance  - Assess pain using appropriate pain scale  - Administer analgesics based on type and severity of pain and evaluate response  - Implement non-pharmacological measures as appropriate and evaluate response  - Consider cultural and social influences on pain and pain management  - Notify physician/advanced practitioner if interventions unsuccessful or patient reports new pain  Outcome: Progressing     Problem: INFECTION - ADULT  Goal: Absence or prevention of progression during hospitalization  Description: INTERVENTIONS:  - Assess and monitor for signs and symptoms of infection  - Monitor lab/diagnostic results  - Monitor all insertion sites, i.e. indwelling lines, tubes, and drains  - Monitor endotracheal if appropriate and nasal secretions for changes in amount and color  - Epworth appropriate cooling/warming therapies per order  - Administer medications as ordered  - Instruct and encourage patient and family to use good hand hygiene technique  - Identify and instruct in appropriate isolation precautions for identified infection/condition  Outcome: Progressing

## 2024-10-21 NOTE — INCIDENTAL FINDINGS
The following findings require follow up:  Radiographic finding   Finding: Hepatic steatosis    Follow up required: Primary care physician   Follow up should be done within 1 month(s)    Please notify the following clinician to assist with the follow up:   Dr. Abraham Del Valle DO / Alicia Walters MD      Incidental finding results were discussed with the Patient by Mehnaz Mclaughlin MD on 10/21/24.   They expressed understanding and all questions answered.

## 2024-10-21 NOTE — PROGRESS NOTES
Progress Note - Surgery-General   Name: Ayesha Herrera 74 y.o. female I MRN: [id number]  Unit/Bed#: S -01 I Date of Admission: 10/19/2024   Date of Service: 10/20/2024 I Hospital Day: 1    Assessment & Plan  Cholecystitis  Patient is a 74-year-old female past medical history of GERD and hypercholesterolemia who presented with acute calculus cholecystitis after 2 days of pain.  She is now status post laparoscopic cholecystectomy on 10/20.  She is overall doing well this morning is hemodynamically stable.      -Continue antibiotics for 24 hours postoperatively  - Possible discharge home today  - Pain and nausea control as needed  - DVT prophylaxis  - Regular diet    Please contact the SecureChat role for the Surgery-General service with any questions/concerns.    Subjective   No acute events overnight.  Patient reports that she has some mild pain in her right upper quadrant.  She denies any nausea, vomiting, fever, chills, chest pain, shortness of breath.  She is tolerating a diet.  She is voiding well.    Objective :  Temp:  [97.6 °F (36.4 °C)-99.1 °F (37.3 °C)] 97.7 °F (36.5 °C)  HR:  [] 85  BP: ()/(51-86) 134/78  Resp:  [16-18] 16  SpO2:  [95 %-100 %] 98 %  O2 Device: Nasal cannula    I/O         10/19 0701  10/20 0700 10/20 0701  10/21 0700    I.V. (mL/kg)  1200 (19.2)    IV Piggyback 1150     Total Intake(mL/kg) 1150 (18.4) 1200 (19.2)    Blood  100    Total Output  100    Net +1150 +1100                  Physical Exam  Constitutional:       Appearance: Normal appearance.   HENT:      Head: Normocephalic and atraumatic.      Right Ear: External ear normal.      Left Ear: External ear normal.      Nose: Nose normal.   Eyes:      Conjunctiva/sclera: Conjunctivae normal.   Cardiovascular:      Rate and Rhythm: Normal rate.      Comments: Appears well-perfused, normotensive.  Pulmonary:      Effort: Pulmonary effort is normal. No respiratory distress.   Abdominal:      General: Abdomen is flat.  There is no distension.      Palpations: Abdomen is soft.      Tenderness: There is abdominal tenderness (Appropriate tenderness in the right upper quadrant wilfrido-incisional). There is no guarding.   Skin:     General: Skin is warm and dry.   Neurological:      General: No focal deficit present.      Mental Status: She is alert and oriented to person, place, and time.   Psychiatric:         Mood and Affect: Mood normal.         Behavior: Behavior normal.           Lab Results: I have reviewed the following results:  Recent Labs     10/20/24  0641   WBC 9.45   HGB 12.8   HCT 38.2      SODIUM 138   K 3.4*      CO2 26   BUN 12   CREATININE 0.61   GLUC 118   MG 1.8*   PHOS 2.6   AST 12*   ALT 15   ALB 4.1   TBILI 0.71   ALKPHOS 47       Imaging Results Review: I reviewed radiology reports from this admission including: CT abdomen/pelvis.  Other Study Results Review: No additional pertinent studies reviewed.    VTE Pharmacologic Prophylaxis: Enoxaparin (Lovenox)  VTE Mechanical Prophylaxis: sequential compression device

## 2024-10-21 NOTE — DISCHARGE SUMMARY
Discharge Summary - Surgery-General   Name: Ayesha Herrera 74 y.o. female I MRN: [id number]  Unit/Bed#: S -01 I Date of Admission: 10/19/2024   Date of Service: 10/21/2024 I Hospital Day: 2    Admission Date: 10/19/2024 1856  Discharge Date: 10/21/24  Admitting Diagnosis: Cholecystitis, acute [K81.0]  Stomach pain [R10.9]  Abdominal pain [R10.9]  Discharge Diagnosis:   Medical Problems        HPI: Ayesha Herrera is a 74 y.o. female with a past medical history of GERD and hypercholesterolemia with a prior open total hysterectomy and bilateral salpingo-oophorectomy who presents with 2 days of acute onset epigastric/right upper quadrant abdominal pain.  She reports the pain started the evening of 10/17 and was sharp in character.  She reports associated chills and nausea.  She reports that given that the pain was not resolving she came to the ED.  She additionally reports decreased appetite and worsening pain with eating.  She denies fevers, vomiting, chest pain, shortness of breath, or dysuria.  She denies any previous episodes similar to this in the past.  She denies the use of any anticoagulation or antiplatelet therapy.  - Justino Hale MD 10/19/2024    Procedures Performed: No orders of the defined types were placed in this encounter.      Summary of Hospital Course: Patient presented on 10/19 with acute onset epigastric/right upper quadrant abdominal pain. Patient was admitted to the general surgery service and made NPO in preparation for laparoscopic cholecystectomy which was performed on 10/21 and revealed a grossly hyperemic and inflamed gallbladder with thickened rind. Needle decompression of gallbladder revealed hydrops, pus and thickened, purulent bile. Ceftriaxone/flagyl was ordered inpatient with a plan to continue antibiotics for 4d. Patient tolerated diet and was discharged home on 10/21 with cefpodoxime/flagyl and instructions to follow-up in 2 weeks.     Significant Findings, Care,  Treatment and Services Provided: Cholecystitis, laparoscopic cholecystectomy    Complications: None    Condition at Discharge: good       Discharge instructions/Information to patient and family:   See After Visit Summary (AVS) for information provided to patient and family.      Provisions for Follow-Up Care:  See after visit summary for information related to follow-up care and any pertinent home health orders.      PCP: Abraham Luna DO    Disposition: Home    Planned Readmission: No     Discharge Medications:  See after visit summary for reconciled discharge medications provided to patient and family.      Discharge Statement:  I have spent a total time of 30 minutes in caring for this patient on the day of the visit/encounter. >30 minutes of time was spent on: Diagnostic results, Risks and benefits of tx options, Instructions for management, Patient and family education, Importance of tx compliance, Impressions, Counseling / Coordination of care, Documenting in the medical record, and Reviewing / ordering tests, medicine, procedures  .

## 2024-10-22 NOTE — UTILIZATION REVIEW
NOTIFICATION OF ADMISSION DISCHARGE   This is a Notification of Discharge from [facility]. Please be advised that this patient has been discharge from our facility. Below you will find the admission and discharge date and time including the patient’s disposition.   UTILIZATION REVIEW CONTACT:  Lara Mei  Utilization   Network Utilization Review Department  Phone: [phone] x carefully listen to the prompts. All voicemails are confidential.  Email: [email or web address]     ADMISSION INFORMATION  PRESENTATION DATE: 10/19/2024  6:56 PM  OBERVATION ADMISSION DATE: N/A  INPATIENT ADMISSION DATE: 10/19/24 11:39 PM   DISCHARGE DATE: 10/21/2024  4:59 PM   DISPOSITION:Home/Self Care    Network Utilization Review Department  ATTENTION: Please call with any questions or concerns to [phone] and carefully listen to the prompts so that you are directed to the right person. All voicemails are confidential.   For Discharge needs, contact Care Management DC Support Team at [phone] opt. 2  Send all requests for admission clinical reviews, approved or denied determinations and any other requests to dedicated fax number below belonging to the campus where the patient is receiving treatment. List of dedicated fax numbers for the Facilities:  FACILITY NAME UR FAX NUMBER   ADMISSION DENIALS (Administrative/Medical Necessity) [phone]   DISCHARGE SUPPORT TEAM ([facility]) [phone]   PARENT CHILD HEALTH (Maternity/NICU/Pediatrics) [phone]   [facility] [phone]   [facility] [phone]   [facility] [phone]   [facility] [phone]   [facility] [phone]   [facility] [phone]   [facility] [phone]   [facility] [phone]    [facility] [phone]   [facility] [phone]   [facility] [phone]   [facility] [phone]

## 2024-10-24 PROCEDURE — 88304 TISSUE EXAM BY PATHOLOGIST: CPT | Performed by: PATHOLOGY

## 2024-11-04 ENCOUNTER — OFFICE VISIT (OUTPATIENT)
Dept: SURGERY | Facility: CLINIC | Age: 74
End: 2024-11-04

## 2024-11-04 VITALS
DIASTOLIC BLOOD PRESSURE: 72 MMHG | BODY MASS INDEX: 28.84 KG/M2 | HEART RATE: 88 BPM | RESPIRATION RATE: 16 BRPM | HEIGHT: 58 IN | WEIGHT: 137.4 LBS | OXYGEN SATURATION: 96 % | TEMPERATURE: 97.3 F | SYSTOLIC BLOOD PRESSURE: 128 MMHG

## 2024-11-04 DIAGNOSIS — K81.9 CHOLECYSTITIS: Primary | ICD-10-CM

## 2024-11-04 PROCEDURE — 99024 POSTOP FOLLOW-UP VISIT: CPT | Performed by: SURGERY

## 2024-11-04 NOTE — ASSESSMENT & PLAN NOTE
74-year-old female who presents for postop follow-up after laparoscopic cholecystectomy on 10/21.  Doing well  exertional pain in the right upper quadrant and intermittentalthough reports continued decreased appetite.  She does report that for the most part she is able to tolerate regular meals and is having normal bowel function.  She reports that her pain is worst when she is getting in and out of bed otherwise it is very well-controlled.  Pathology was consistent with chronic cholecystitis and cholelithiasis.  Patient is discharged from follow-up although was given instructions to call the office if she is having worsening pain or develops p.o. intolerance.

## 2024-11-04 NOTE — PROGRESS NOTES
"Ambulatory Visit  Name: Ayesha Herrera      : 1950      MRN: [id number]  Encounter Provider: Abraham Parker MD  Encounter Date: 2024   Encounter department: [facility] [place]    Assessment & Plan  Cholecystitis  74-year-old female who presents for postop follow-up after laparoscopic cholecystectomy on 10/21.  Doing well  exertional pain in the right upper quadrant and intermittentalthough reports continued decreased appetite.  She does report that for the most part she is able to tolerate regular meals and is having normal bowel function.  She reports that her pain is worst when she is getting in and out of bed otherwise it is very well-controlled.  Pathology was consistent with chronic cholecystitis and cholelithiasis.  Patient is discharged from follow-up although was given instructions to call the office if she is having worsening pain or develops p.o. intolerance.           History of Present Illness     Ayesha Herrera is a 74 y.o. female who presents for postop visit for laparoscopic cholecystectomy performed on 10/21.  Patient is doing well; reports exertional pain in the right upper quadrant as well as intermittent decreased appetite.  She reports that she has been otherwise tolerating diet well and having bowel function.  Her abdominal pain is well-controlled aside from when getting in and out of bed.  She denies fevers, chills, chest pain, shortness of breath, nausea, vomiting, or dysuria.      Review of Systems  As stated in the above HPI; otherwise negative      Objective     /72   Pulse 88   Temp (!) 97.3 °F (36.3 °C) (Temporal)   Resp 16   Ht 4' 10\" (1.473 m)   Wt 62.3 kg (137 lb 6.4 oz)   SpO2 96%   BMI 28.72 kg/m²     Physical Exam  General: NAD  Skin: Warm, dry, anicteric  HEENT: Normocephalic, atraumatic  CV: RRR  Pulm:  Nonlabored breathing on room air  Abd: Soft, nontender, nondistended; incisions well-healing with skin glue in place  MSK: " Symmetric, no edema, no tenderness, no deformity  Neuro: AOx3, GCS 15

## 2025-05-05 ENCOUNTER — ANNUAL EXAM (OUTPATIENT)
Dept: OBGYN CLINIC | Facility: CLINIC | Age: 75
End: 2025-05-05
Payer: COMMERCIAL

## 2025-05-05 VITALS
BODY MASS INDEX: 29.52 KG/M2 | HEIGHT: 58 IN | WEIGHT: 140.6 LBS | DIASTOLIC BLOOD PRESSURE: 72 MMHG | SYSTOLIC BLOOD PRESSURE: 142 MMHG

## 2025-05-05 DIAGNOSIS — Z12.31 ENCOUNTER FOR SCREENING MAMMOGRAM FOR MALIGNANT NEOPLASM OF BREAST: ICD-10-CM

## 2025-05-05 DIAGNOSIS — Z01.419 ENCOUNTER FOR ANNUAL ROUTINE GYNECOLOGICAL EXAMINATION: Primary | ICD-10-CM

## 2025-05-05 PROCEDURE — G0101 CA SCREEN;PELVIC/BREAST EXAM: HCPCS | Performed by: OBSTETRICS & GYNECOLOGY

## 2025-05-05 NOTE — PROGRESS NOTES
Name: Ayesha Herrera      : 1950      MRN: [id number]  Encounter Provider: Ariana Carmona DO  Encounter Date: 2025   Encounter department: [facility]  :  Assessment & Plan  Encounter for annual routine gynecological examination         Encounter for screening mammogram for malignant neoplasm of breast    Orders:    Mammo screening bilateral w 3d and cad; Future    Pap deferred due to low risk status.Continue annual mammogram. Reviewed colon cancer screening, up-to-date with colonoscopy.  Reviewed osteoporosis screening, up-to-date DEXA scan.  She will continue to follow-up with primary care as scheduled. Return to office in 2 years per Medicare recommendations/protocol.     History of Present Illness   HPI  Ayesha Herrera is a 74 y.o. female who presents     This is a pleasant 74-year-old female P1 ( x 1, age 47) presents for her GYN exam.  Patient went through menopause in  after JUAN/BSO.  She was on hormone replacement therapy for approximately 15 years thereafter.  She denies any vaginal bleeding or spotting.  No changes in bowel or bladder function.  She has been  for over 52 years.  No history of abnormal Pap smears.  They are not sexually active.  She does follow-up with her family physician on a regular basis.    H/o TAHBSO w chiki giang , benign age 38    Colon 2021 done , due 2026    Mammo 2024, schedule     Dexa 2023, osteopenia      History obtained from: patient    Review of Systems   Constitutional:  Negative for fatigue, fever and unexpected weight change.   Respiratory:  Negative for cough, chest tightness, shortness of breath and wheezing.    Cardiovascular: Negative.  Negative for chest pain and palpitations.   Gastrointestinal: Negative.  Negative for abdominal distention, abdominal pain, blood in stool, constipation, diarrhea, nausea and vomiting.   Genitourinary: Negative.  Negative for difficulty urinating, dyspareunia,  "dysuria, flank pain, frequency, genital sores, hematuria, pelvic pain, urgency, vaginal bleeding, vaginal discharge and vaginal pain.   Skin:  Negative for rash.     Current Outpatient Medications on File Prior to Visit   Medication Sig Dispense Refill    Calcium Citrate-Vitamin D 250-200 MG-UNIT TABS Take 1 tablet by mouth      Cholecalciferol (Vitamin D3) 125 MCG (5000 UT) TABS Take 5,000 Units by mouth daily      ezetimibe (ZETIA) 10 mg tablet       fenofibrate (TRICOR) 145 mg tablet Take 145 mg by mouth      Multiple Vitamins-Minerals (Centrum Silver Ultra Womens) TABS Take by mouth      pantoprazole (PROTONIX) 40 mg tablet Take by mouth      LUTEIN PO Take 1 tablet by mouth      oxyCODONE (ROXICODONE) 5 immediate release tablet Take 0.5 tablets (2.5 mg total) by mouth every 6 (six) hours as needed for moderate pain for up to 8 doses Max Daily Amount: 10 mg (Patient not taking: Reported on 5/5/2025) 4 tablet 0     No current facility-administered medications on file prior to visit.         Objective   /72   Ht 4' 10\" (1.473 m)   Wt 63.8 kg (140 lb 9.6 oz)   BMI 29.39 kg/m²      Physical Exam  Constitutional:       Appearance: Normal appearance. She is well-developed.   HENT:      Head: Normocephalic and atraumatic.   Cardiovascular:      Rate and Rhythm: Normal rate and regular rhythm.   Pulmonary:      Effort: Pulmonary effort is normal.      Breath sounds: Normal breath sounds.   Chest:   Breasts:     Right: No inverted nipple, mass, nipple discharge, skin change or tenderness.      Left: No inverted nipple, mass, nipple discharge, skin change or tenderness.   Abdominal:      General: Bowel sounds are normal. There is no distension.      Palpations: Abdomen is soft.      Tenderness: There is no abdominal tenderness. There is no guarding or rebound.   Genitourinary:     Labia:         Right: No rash, tenderness or lesion.         Left: No rash, tenderness or lesion.       Vagina: Normal. No signs of " injury. No vaginal discharge or tenderness.      Uterus: Absent.       Adnexa:         Right: No mass, tenderness or fullness.          Left: No mass, tenderness or fullness.     Neurological:      Mental Status: She is alert.   Psychiatric:         Behavior: Behavior normal.     External genitalia is within normal limits.  The vagina is evident of slight estrogen deficiency.  The cervix is surgically absent.  The cuff is well supported.    Administrative Statements   I have spent a total time of 25 minutes in caring for this patient on the day of the visit/encounter including Impressions, Counseling / Coordination of care, Documenting in the medical record, Reviewing/placing orders in the medical record (including tests, medications, and/or procedures), and Obtaining or reviewing history  .

## 2025-07-09 ENCOUNTER — HOSPITAL ENCOUNTER (OUTPATIENT)
Dept: RADIOLOGY | Age: 75
Discharge: HOME | End: 2025-07-09
Payer: COMMERCIAL

## 2025-07-09 VITALS — BODY MASS INDEX: 29.52 KG/M2 | WEIGHT: 140.6 LBS | HEIGHT: 58 IN

## 2025-07-09 DIAGNOSIS — Z12.31 VISIT FOR SCREENING MAMMOGRAM: ICD-10-CM

## 2025-07-09 PROCEDURE — 77067 SCR MAMMO BI INCL CAD: CPT

## 2025-07-09 PROCEDURE — 77063 BREAST TOMOSYNTHESIS BI: CPT

## (undated) DEVICE — INTENDED FOR TISSUE SEPARATION, AND OTHER PROCEDURES THAT REQUIRE A SHARP SURGICAL BLADE TO PUNCTURE OR CUT.: Brand: BARD-PARKER SAFETY BLADES SIZE 15, STERILE

## (undated) DEVICE — TROCAR: Brand: KII FIOS FIRST ENTRY

## (undated) DEVICE — LIGACLIP 10-M/L, 10MM ENDOSCOPIC ROTATING MULTIPLE CLIP APPLIERS: Brand: LIGACLIP

## (undated) DEVICE — EXOFIN PRECISION PEN HIGH VISCOSITY TOPICAL SKIN ADHESIVE: Brand: EXOFIN PRECISION PEN, 1G

## (undated) DEVICE — 5 MM CURVED DISSECTORS WITH MONOPOLAR CAUTERY: Brand: ENDOPATH

## (undated) DEVICE — Device: Brand: OMNICLOSE TROCAR SITE CLOSURE DEVICE

## (undated) DEVICE — PACK PBDS LAP CHOLE RF

## (undated) DEVICE — ELECTRODE LAP BLADE CRV E-Z CLEAN 33CM -0019

## (undated) DEVICE — INSUFFLATION NEEDLE TO ESTABLISH PNEUMOPERITONEUM.: Brand: INSUFFLATION NEEDLE

## (undated) DEVICE — GLOVE INDICATOR UNDERGLOVE SZ 6 BLUE

## (undated) DEVICE — SUT VICRYL 0 UR-6 27 IN J603H

## (undated) DEVICE — ELECTRODE LAP J HOOK E-Z CLEAN 33CM-0021

## (undated) DEVICE — LIGAMAX 5 MM ENDOSCOPIC MULTIPLE CLIP APPLIER: Brand: LIGAMAX

## (undated) DEVICE — NEEDLE 25G X 1 1/2

## (undated) DEVICE — PENCIL ELECTROSURG E-Z CLEAN -0035H

## (undated) DEVICE — TOWEL SURG XR DETECT GREEN STRL RFD

## (undated) DEVICE — SUT MONOCRYL 4-0 PS-2 27 IN Y426H

## (undated) DEVICE — TROCAR: Brand: KII® SLEEVE

## (undated) DEVICE — TISSUE RETRIEVAL SYSTEM: Brand: INZII RETRIEVAL SYSTEM